# Patient Record
Sex: FEMALE | Race: WHITE | NOT HISPANIC OR LATINO | Employment: FULL TIME | URBAN - METROPOLITAN AREA
[De-identification: names, ages, dates, MRNs, and addresses within clinical notes are randomized per-mention and may not be internally consistent; named-entity substitution may affect disease eponyms.]

---

## 2021-05-29 ENCOUNTER — RECORDS - HEALTHEAST (OUTPATIENT)
Dept: ADMINISTRATIVE | Facility: CLINIC | Age: 21
End: 2021-05-29

## 2023-01-06 ENCOUNTER — HOSPITAL ENCOUNTER (OUTPATIENT)
Dept: BEHAVIORAL HEALTH | Facility: CLINIC | Age: 23
Discharge: HOME | End: 2023-01-06
Attending: FAMILY MEDICINE | Admitting: FAMILY MEDICINE
Payer: COMMERCIAL

## 2023-01-06 ENCOUNTER — BEH TREATMENT PLAN (OUTPATIENT)
Dept: BEHAVIORAL HEALTH | Facility: CLINIC | Age: 23
End: 2023-01-06
Attending: PSYCHIATRY & NEUROLOGY
Payer: COMMERCIAL

## 2023-01-06 PROCEDURE — 90791 PSYCH DIAGNOSTIC EVALUATION: CPT | Mod: GT,95 | Performed by: COUNSELOR

## 2023-01-06 ASSESSMENT — ANXIETY QUESTIONNAIRES
GAD7 TOTAL SCORE: 12
5. BEING SO RESTLESS THAT IT IS HARD TO SIT STILL: SEVERAL DAYS
2. NOT BEING ABLE TO STOP OR CONTROL WORRYING: MORE THAN HALF THE DAYS
IF YOU CHECKED OFF ANY PROBLEMS ON THIS QUESTIONNAIRE, HOW DIFFICULT HAVE THESE PROBLEMS MADE IT FOR YOU TO DO YOUR WORK, TAKE CARE OF THINGS AT HOME, OR GET ALONG WITH OTHER PEOPLE: SOMEWHAT DIFFICULT
1. FEELING NERVOUS, ANXIOUS, OR ON EDGE: MORE THAN HALF THE DAYS
7. FEELING AFRAID AS IF SOMETHING AWFUL MIGHT HAPPEN: SEVERAL DAYS
6. BECOMING EASILY ANNOYED OR IRRITABLE: MORE THAN HALF THE DAYS
3. WORRYING TOO MUCH ABOUT DIFFERENT THINGS: MORE THAN HALF THE DAYS
GAD7 TOTAL SCORE: 12

## 2023-01-06 ASSESSMENT — COLUMBIA-SUICIDE SEVERITY RATING SCALE - C-SSRS
6. HAVE YOU EVER DONE ANYTHING, STARTED TO DO ANYTHING, OR PREPARED TO DO ANYTHING TO END YOUR LIFE?: NO
2. HAVE YOU ACTUALLY HAD ANY THOUGHTS OF KILLING YOURSELF?: NO
TOTAL  NUMBER OF ABORTED OR SELF INTERRUPTED ATTEMPTS LIFETIME: NO
ATTEMPT LIFETIME: NO
TOTAL  NUMBER OF INTERRUPTED ATTEMPTS LIFETIME: NO
1. HAVE YOU WISHED YOU WERE DEAD OR WISHED YOU COULD GO TO SLEEP AND NOT WAKE UP?: NO

## 2023-01-06 ASSESSMENT — PATIENT HEALTH QUESTIONNAIRE - PHQ9
5. POOR APPETITE OR OVEREATING: MORE THAN HALF THE DAYS
SUM OF ALL RESPONSES TO PHQ QUESTIONS 1-9: 6

## 2023-01-06 NOTE — PROGRESS NOTES
"    Admission SBAR NOTE  Adult  Outpatient Programs          SITUATION:     Admission Date: 2023    Provider verified identity through the following two step process.  Patient provided: verbal spelling of full first and last name and Patient     Patient name:  Lady Ayers  Preferred name: Lady She/Her/Hers/Herself 22 year old  Diagnosis/-es (copy from [facility], including ICD-10):   300.3 (F42) Obsessive Compulsive Disorder.  4. Other Diagnoses that is relevant to services:   300.02 (F41.1) Generalized Anxiety Disorder.      Assigned Program/Track: [facility]    Reviewed patient's schedule and informed them of any variation due to holidays. yes    Does the patient have any planned absences and/or barriers to admission/treatment? yes - start job on  (FT 8a-430p)  NOTE: impact of transportation, technology, childcare, work, or housing concerns.    Insurance: Payor: BCBS / Plan: [employer] [place] / Product Type: Indemnity /  Changes/Concerns: no    Does patient need an appointment with the program provider? yes Tues at 2pm, [facility]  NOTE: If yes, please confirm/schedule provider visit.      BACKGROUND:     Patient's stated goal/reason for treatment (copy from [facility]; confirm with patient): \"\" OCD, Anxiety.To get some tools under my belt, so when I run into things when I get high anxiety, I can get through them a lot easier.\"      ASSESSMENT:     Please consult  if any of the following concerns may impact admission/participation in program:     PHQ, LUKE and PROMIS done within 7 days OR send upon admission if over 7 days.      [place] Suicide Severity Rating (select Lifetime/Recent):   [place] Suicide Severity Rating Scale (Lifetime/Recent)  [place] Suicide Severity Rating (Lifetime/Recent) 2023   1. Wish to be Dead (Lifetime) 0   2. Non-Specific Active Suicidal Thoughts (Lifetime) 0   Actual Attempt (Lifetime) 0   Has subject engaged in non-suicidal self-injurious behavior? (Lifetime) 0 "   Interrupted Attempts (Lifetime) 0   Aborted or Self-Interrupted Attempt (Lifetime) 0   Preparatory Acts or Behavior (Lifetime) 0   Calculated C-SSRS Risk Score (Lifetime/Recent) No Risk Indicated       WHODAS completed for recommended level of care? yes  Current WHODAS was assigned and patient needs the following level of care based on score 19          Copy/Paste current Safety Plan to the BEH TX PLAN ENCOUNTER. no  - Per DA: Patient denies current self-injurious ideation and behaviors       Safety status/concerns: no     Substance use concerns: no     Pertinent Medical/Nutritional concerns: no    Review Tele-Health Requirements (including secure environment, confidentiality, in-state status, equipment needs and process - encourage MyChart): yes    Confirm Emergency Contact listed in the SnapShot/Demographics with patient and notify [facility] if an update is required. yes ;   Mika Ayers [phone]       Paper or Docusign requirements for ROIs, e-CASIMIRO, emergency contact, etc have been completed? no - resent  If not, do upon admission.     Does patient have FMLA or Short-Term Disability requests/plans? no  NOTE: Whenever possible, FMLA or Short-Term Disability paperwork needs to be managed/completed by the patient's community provider.   Exceptions: Patient does not have a community provider AND request is specific to mental health and time off for the duration of the program participation.    Notify RN Triage as soon as possible.     Care Providers/Medication Management Needs:     Does patient have a current community or other [facility] provider prescribing medications for mental health? no  - has PCP  NOTE: Delete below if not applicable:    Psychiatric Provider (or PCP if managing MH meds)/Name: none; will make referral        NOTE: Inform patients, program is temporary and we will not be transferring care. Patient's should continue to see their community provider.       Individual Therapist/Name:  None;  will make referral      RECOMMENDATIONS:     Patient Admission Completed: yes    Care Team, referrals made/needed: yes  - psych and IT referrals  PCP: Shun Wood  NOTE: Notify RN, as needed, to make internal referrals.                                                             Completed by: Lizz Haro RN

## 2023-01-06 NOTE — PROGRESS NOTES
"RN Review of Medical History / Physical Health Screen  Outpatient Mental Health Programs - [facility] Day [facility]    PATIENT'S NAME: Lady Ayers  MRN:   [id number]  :   2000  ACCT. NUMBER: [id number]  CURRENT AGE:  22 year old    DATE OF DIAGNOSTIC ASSESSMENT: 2023    DATE OF ADMISSION: 23     Please see Diagnostic Assessment for additional Medical History.     General Health:   Have you had any exposure to any communicable disease in the past 2-3 weeks? no     Are you aware of safe sex practices? yes   Do you have a history of seizures?     If so, do you have a seizure plan? Known triggers?     Notify patient that we will call 911 (if virtual) or a code (if in-person), if we were to witness seizure during group. no    no      yes     Nutrition:    Are you on a special diet? If yes, please explain:  no   Do you have any concerns regarding your nutritional status? If yes, please explain:  yes - would like to get better eating habits r/t tiredness   Have you had any appetite changes in the last 3 months?  Yes, little bit less hungry r/t anxiety     Have you had any weight loss or weight gain in the last 3 months?  Yes, how much? Lost a few pounds     Do you have a history of an eating disorder? no   Do you have a history of being in an eating disorder program? no         Height/Weight Review:  Patient reported height:  5'6\"      Patient reports weight:  Date last checked:  118 pounds   yesterday   Any referrals/needs identified?  none          Patient height and weight recorded by RN in epic flowsheet: No; Unable to measure  Programmatic Care currently provided via telehealth. All pt weights and heights will be collected through patient self-report and recorded in physical health screening progress note upon admission to the program.      BMI Review:  Was the patient informed of BMI? no      Findings See above         Fall Risk:   Have you had any falls in the " past 3 months? no     Do you currently use any assistive devices for mobility?   no      Does the patient have medication concerns? no - but would be curious about anxiety meds   If yes, RN to triage if patient will address concerns with their community provider or with our program psychiatrist.     Does the patient have any acute or chronic pain concerns that might impact participation in the program? no       Additional Comments/Assessment: Pt denies seizures (current/historical), dizziness, mobility concerns. No fall risk assessed; No safety concerns r/t falls. Reports occasional dizziness upon standing; nothing to the point of fainting/falling      Per completion of the Medical History / Physical Health Screen, is there a recommendation to see / follow up with a primary care physician/clinic or dentist?    No.      Lizz Haro RN  1/6/2023

## 2023-01-06 NOTE — PROGRESS NOTES
"[facility]      PATIENT'S NAME: Lady Ayers  PREFERRED NAME: Lady  PRONOUNS: she/her/hers     MRN: [id number]  : 2000  ADDRESS: [address]  [place] [id number]  ACCT. NUMBER:  [id number]  DATE OF SERVICE: 23  START TIME: 1100  END TIME: 1230  PREFERRED PHONE: [phone]  email:[email or web address]  May we leave a program related message: Yes  SERVICE MODALITY:  Video Visit:      Provider verified identity through the following two step process.  Patient provided:  Patient  and Patient address    Telemedicine Visit: The patient's condition can be safely assessed and treated via synchronous audio and visual telemedicine encounter.      Reason for Telemedicine Visit: Services only offered telehealth    Originating Site (Patient Location): Patient's home    Distant Site (Provider Location): Provider Remote Setting- Home Office    Consent:  The patient/guardian has verbally consented to: the potential risks and benefits of telemedicine (video visit) versus in person care; bill my insurance or make self-payment for services provided; and responsibility for payment of non-covered services.     Patient would like the video invitation sent by:  My Chart    Mode of Communication:  Video Conference via [employer]    Distant Location (Provider):  Off-site    As the provider I attest to compliance with applicable laws and regulations related to telemedicine.    UNIVERSAL ADULT Mental Health DIAGNOSTIC ASSESSMENT    Identifying Information:  Patient is a 22 year old,    individual.  Patient was referred for an assessment by self .  Patient attended the session alone.    Chief Complaint:   The reason for seeking services at this time is: \" OCD, Anxiety \"   The problem(s) began as always presents and would like to finally address. Patient has attempted to resolve these concerns in the past through providers while in college.    Social/Family " "History:  Patient reported they grew up in [place], [place] then [place], [place].  They were raised by biological parents.  Parents  16 years ago when the patient was 6 years old. The patient mother did not remarry and remains single The patient's father did remarry 12 years ago.   Patient reported that their childhood was \"amazing\".  Patient described their current relationships with family of origin as \"great relationship with mom, good with dad\".      The patient describes their cultural background as \"white - , Latter day\".  Cultural influences and impact on patient's life structure, values, norms, and healthcare: Patient denies.  Contextual influences on patient's health include: Contextual Factors: Individual Factors Unmanaged OCD, Anxiety.  Cultural, Contextual, and socioeconomic factors do not affect the patient's access to services.  These factors will be addressed in the Preliminary Treatment plan.  Patient identified their preferred language to be English. Patient reported they do not  need the assistance of an  or other support involved in therapy.     Patient reported had no significant delays in developmental tasks.   Patient's highest education level was college graduate. Patient identified the following learning problems: none reported.  Modifications will not be used to assist communication in therapy.   Patient reports they are  able to understand written materials.    Patient reported the following relationship history:  Patient reports being in a relationships until 12/17/22.  Patient's current relationship status is single for a couple of weeks.   Patient identified their sexual orientation as heterosexual.  Patient reported having zero child(sendy). Patient identified parents, siblings and friends as part of their support system.  Patient identified the quality of these relationships as stable and meaningful.     Patient's current living/housing situation involves staying " with family.  They live with dad and they report that housing is stable.     Patient is currently waiting to start their first job on the 30th.  Patient reports their finances are obtained through family.  Patient does not identify finances as a current stressor.      Patient reported that they have not been involved with the legal system.   Patient denies being on probation / parole / under the jurisdiction of the court.      Patient's Strengths and Limitations:  Patient identified the following strengths or resources that will help them succeed in treatment: commitment to health and well being. Things that may interfere with the patient's success in treatment include: none identified.     Assessments:  The following assessments were completed by patient for this visit:  PHQ9:   PHQ-9 SCORE 1/6/2023   PHQ-9 Total Score 6     GAD7:   LUKE-7 SCORE 1/6/2023   Total Score 12     CAGE-AID:   CAGE-AID Total Score 1/6/2023   Total Score 3     PROMIS 10-Global Health (only subscores and total score):   PROMIS-10 Scores Only 1/6/2023   Global Mental Health Score 13   Global Physical Health Score 15   PROMIS TOTAL - SUBSCORES 28     [place] Suicide Severity Rating Scale (Lifetime/Recent)  [place] Suicide Severity Rating (Lifetime/Recent) 1/6/2023   1. Wish to be Dead (Lifetime) 0   2. Non-Specific Active Suicidal Thoughts (Lifetime) 0   Actual Attempt (Lifetime) 0   Has subject engaged in non-suicidal self-injurious behavior? (Lifetime) 0   Interrupted Attempts (Lifetime) 0   Aborted or Self-Interrupted Attempt (Lifetime) 0   Preparatory Acts or Behavior (Lifetime) 0   Calculated C-SSRS Risk Score (Lifetime/Recent) No Risk Indicated       Personal and Family Medical History:  Patient does not report a family history of mental health concerns.  Patient reports family history includes Substance Abuse in her mother..     Patient does report Mental Health Diagnosis and/or Treatment.  Patient Patient reported the following  previous diagnoses which include(s): an Anxiety Disorder and Obsessive Compulsive Disorder.  Patient reported symptoms began during school years.   Patient has received mental health services in the past: therapy.  Psychiatric Hospitalizations: None.  Patient denies a history of civil commitment.  Patient is not receiving other mental health services.  These include none.         Patient has not had a physical exam to rule out medical causes for current symptoms.  Date of last physical exam was greater than a year ago and client was encouraged to schedule an exam with PCP. The patient does not have a Primary Care Provider and was encouraged to establish care with a PCP..  Patient reports no current medical and/or dental concerns.  Patient denies any issues with pain..   There are not significant appetite / nutritional concerns / weight changes. These may include: no concerns. Patient reports the following sleep concerns:  No concerns.   Patient does not report a history of head injury / trauma / cognitive impairment.      Patient reports not taking any current medications    Medication Adherence:  Patient reports not taking any medications.    Patient Allergies:  Not on File    Medical History:  History reviewed. No pertinent past medical history.      Current Mental Status Exam:   Appearance:  Appropriate    Eye Contact:  Good   Psychomotor:  Normal       Gait / station:  no problem  Attitude / Demeanor: Cooperative  Interested  Speech      Rate / Production: Normal/ Responsive      Volume:  Normal  volume      Language:  intact  Mood:   Normal  Affect:   Appropriate    Thought Content: Clear   Thought Process: Logical       Associations: No loosening of associations  Insight:   Good   Judgment:  Intact   Orientation:  All  Attention/concentration: Good      Substance Use:  Patient did report a family history of substance use concerns; see medical history section for details.  Patient has not received chemical  "dependency treatment in the past.  Patient has not ever been to detox.      Patient is not currently receiving any chemical dependency treatment. Patient reported the following problems as a result of their substance use: Patient denies.      Substance Age of first use Pattern and duration of use (include amounts and frequency) Date of last use     Withdrawal potential Route of administration   has used Alcohol 17 Patient reports drinking twice a week on the weekends on most weekends while college and would drink excessively during these episodes. 12/31/22 No Oral   has used Marijuana   16 Patient reports starting to use more consistently while in college and using dab carts.  Patient reports using \"actual weed\" and then has moved into Delta and CBD.   Patient reports using most days using a Delta Pen at night before bed. 12/20/22 No Smoke   has not used Amphetamines          has not used Cocaine/crack           has not used Hallucinogens        has not used Inhalants        has not used Heroin        has not used Other Opiates        has not used Benzodiazepine          has not used Barbiturates        has not used Over the counter meds.        has not use Caffeine        has used Nicotine  17 Patient reports using a vape daily, all day. 1/6/23 Yes Smoke   has not used other substances not listed above:  Identify:             Substance Use: passing out, hangovers and cravings/urges to use    Based on the positive CAGE score and clinical interview there  are indications of drug or alcohol abuse. However, patient contributes excessive use to college and has since graduated.  Patient expresses less concerns moving forward..      Significant Losses / Trauma / Abuse / Neglect Issues:   Patient   did not serve in the .  There are indications or report of significant loss, trauma, abuse or neglect issues related to: Parental Divorce at age 6, moms use of alcohol.  Concerns for possible neglect are not present. "     Safety Assessment:   Patient denies current homicidal ideation and behaviors.  Patient denies current self-injurious ideation and behaviors.    Patient denied risk behaviors associated with substance use.  Patient denies any high risk behaviors associated with mental health symptoms.  Patient reports the following current concerns for their personal safety: None.  Patient reports there  are not firearms in the house.       There are no firearms in the home..    History of Safety Concerns:  Patient denied a history of homicidal ideation.     Patient denied a history of personal safety concerns.    Patient denied a history of assaultive behaviors.    Patient denied a history of sexual assault behaviors.     Patient denied a history of risk behaviors associated with substance use.  Patient denies any history of high risk behaviors associated with mental health symptoms.  Patient reports the following protective factors:  future focused thinking    Risk Plan:  See Recommendations for Safety and Risk Management Plan    Review of Symptoms per patient report:   Depression: Lack of interest, Difficulties concentrating, Ruminations and Irritability  Patricia:  No Symptoms  Psychosis: No Symptoms  Anxiety: Excessive worry, Nervousness, Physical complaints, such as headaches, stomachaches, muscle tension, Social anxiety, Sleep disturbance, Ruminations and Irritability  Panic:  Sense of impending doom  Post Traumatic Stress Disorder:  No Symptoms   Eating Disorder: No Symptoms  ADD / ADHD:  Inattentive, Difficulties listening, Poor task completion, Poor organizational skills, Distractibility, Forgetful and Restlessness/fidgety  Conduct Disorder: No symptoms  Autism Spectrum Disorder: No symptoms  Obsessive Compulsive Disorder: Checking, Cleaning, Counting, Symetry and Obsessions    Patient reports the following compulsive behaviors and treatment history: Picking - has not had treatment. and Social Media - has not had  treatment.  Shopping has not had treatment.    Diagnostic Criteria:   Generalized Anxiety Disorder  A. Excessive anxiety and worry about a number of events or activities (such as work or school performance).   B. The person finds it difficult to control the worry.  C. Select 3 or more symptoms (required for diagnosis). Only one item is required in children.   - Restlessness or feeling keyed up or on edge.    - Being easily fatigued.    - Difficulty concentrating or mind going blank.    - Irritability.    - Muscle tension.   D. The focus of the anxiety and worry is not confined to features of an Axis I disorder.  E. The anxiety, worry, or physical symptoms cause clinically significant distress or impairment in social, occupational, or other important areas of functioning.   F. The disturbance is not due to the direct physiological effects of a substance (e.g., a drug of abuse, a medication) or a general medical condition (e.g., hyperthyroidism) and does not occur exclusively during a Mood Disorder, a Psychotic Disorder, or a Pervasive Developmental Disorder. Obsessive Compulsive Disorder Criteria: Obsessive Compulsive Disorder    (1) recurrent and persistent thoughts, impulses, or images that are experienced, at some time during the disturbance, as intrusive and inappropriate and that cause marked anxiety or distress     (2) the thoughts, impulses, or images are not simply excessive worries about real-life problems     (3) the client attempts to ignore or suppress such thoughts, impulses, or images, or to neutralize them with some other thought or action     (4) the client recognizes that the obsessional thoughts, impulses, or images are a product of his or her own mind (not imposed from without as in thought insertion)     (2) the behaviors or mental acts are aimed at preventing or reducing distress or preventing some dreaded event or situation; however, these behaviors or mental acts either are not connected in a  realistic way with what they are designed to neutralize or prevent or are clearly excessive   At some point during the course of the disorder, the person has recognized that the obsessions or compulsions are excessive or unreasonable  The obsessions or compulsions cause marked distress, are time consuming (take more than 1 hour a day), or significantly interfere with the person's normal routine, occupational (or academic) functioning, or usual social activities or relationships.   The content of the obsessions or compulsions are not restricted to another Axis I Disorder (e.g., preoccupation with food in the presence of an Eating Disorders; hair pulling in the presence of Trichotillomania; concern with appearance in the presence of Body Dysmorphic Disorder; preoccupation with drugs in the presence of a Substance Use Disorder; preoccupation with having a serious illness in the presence of Hypochondriasis; preoccupation with sexual urges or fantasies in the presence of a Paraphilia; or guilty ruminations in the presence of Major Depressive Disorder).   The disturbance is not due to the direct physiological effects of a substance (e.g., a drug of abuse, a medication) or a general medical condition    Functional Status:  Patient reports the following functional impairments:  management of the household and or completion of tasks, relationship(s) and social interactions.     Programmatic care:  Current WHODAS was assigned and patient needs the following level of care based on score 19  .    Clinical Summary:  1. Reason for assessment: to determine level of care  .  2. Psychosocial, Cultural and Contextual Factors: Unmanaged Anxiety and OCD  .  3. Principal DSM5 Diagnoses  (Sustained by DSM5 Criteria Listed Above):   300.3 (F42) Obsessive Compulsive Disorder.  4. Other Diagnoses that is relevant to services:   300.02 (F41.1) Generalized Anxiety Disorder.  5. Provisional Diagnosis: Further diagnosis clarification is  intentional.  6. Prognosis: Expect Improvement.  7. Likely consequences of symptoms if not treated: higher level of care care.  8. Client strengths include:  insightful, intelligent, motivated and open to learning .     Recommendations:     1. Plan for Safety and Risk Management:   Safety and Risk: Recommended that patient call 911 or go to the local ED should there be a change in any of these risk factors..          Report to child / adult protection services was NA.     2. Patient's identified Patient denies.     3. Initial Treatment will focus on:    Anxiety - ..     4. Resources/Service Plan:    services are not indicated.   Modifications to assist communication are not indicated.   Additional disability accommodations are not indicated.      5. Collaboration:   Collaboration / coordination of treatment will be initiated with the following  support professionals: None.      6.  Referrals:   The following referral(s) will be initiated: Outpatient Mental Health Therapy Group. Next Scheduled Appointment: [facility] with start 1/9/23.      A Release of Information has been obtained for the following: Emergency Contact.     Emergency Contact Mika Ayers was obtained.      Clinical Substantiation/medical necessity for the above recommendations:  Patient presents today in hopes of entering programming for OCD/Anxiety concerns.  Patient reports these concerns have been present and were managed while in college at [employer].  Patient reports she is seeking help now due to conversations with family.  Patient also expressed concerns regarding excessive vaping and is hoping to reduce use.  Patient denies current thoughts of self harm and suicidal ideation and reports ability to keep self safe.    7. MELQUIADES:    MELQUIADES:  Discussed the general effects of drugs and alcohol on health and well-being and Discussed the impact of drugs and alcohol when used during pregnancy. Provider gave patient printed information about the   effects of chemical use on their health and well being. Recommendations:  To abstain from all mood altering substances .     8. Records:   These were reviewed at time of assessment.   Information in this assessment was obtained from the medical record and provided by patient who is a good historian.    Patient will have open access to their mental health medical record.    9.   Interactive Complexity: No      Provider Name/ Credentials:  [facility]  January 6, 2023

## 2023-01-09 ENCOUNTER — HOSPITAL ENCOUNTER (OUTPATIENT)
Dept: BEHAVIORAL HEALTH | Facility: CLINIC | Age: 23
Discharge: HOME | End: 2023-01-09
Attending: PSYCHIATRY & NEUROLOGY
Payer: COMMERCIAL

## 2023-01-09 ENCOUNTER — TELEPHONE (OUTPATIENT)
Dept: BEHAVIORAL HEALTH | Facility: CLINIC | Age: 23
End: 2023-01-09

## 2023-01-09 DIAGNOSIS — F42.9 OBSESSIVE-COMPULSIVE DISORDER: Primary | ICD-10-CM

## 2023-01-09 PROCEDURE — 90853 GROUP PSYCHOTHERAPY: CPT | Mod: GT,95

## 2023-01-09 ASSESSMENT — ANXIETY QUESTIONNAIRES
GAD7 TOTAL SCORE: 12
GAD7 TOTAL SCORE: 12
3. WORRYING TOO MUCH ABOUT DIFFERENT THINGS: MORE THAN HALF THE DAYS
IF YOU CHECKED OFF ANY PROBLEMS ON THIS QUESTIONNAIRE, HOW DIFFICULT HAVE THESE PROBLEMS MADE IT FOR YOU TO DO YOUR WORK, TAKE CARE OF THINGS AT HOME, OR GET ALONG WITH OTHER PEOPLE: SOMEWHAT DIFFICULT
1. FEELING NERVOUS, ANXIOUS, OR ON EDGE: MORE THAN HALF THE DAYS
8. IF YOU CHECKED OFF ANY PROBLEMS, HOW DIFFICULT HAVE THESE MADE IT FOR YOU TO DO YOUR WORK, TAKE CARE OF THINGS AT HOME, OR GET ALONG WITH OTHER PEOPLE?: SOMEWHAT DIFFICULT
7. FEELING AFRAID AS IF SOMETHING AWFUL MIGHT HAPPEN: MORE THAN HALF THE DAYS
7. FEELING AFRAID AS IF SOMETHING AWFUL MIGHT HAPPEN: MORE THAN HALF THE DAYS
GAD7 TOTAL SCORE: 12
8. IF YOU CHECKED OFF ANY PROBLEMS, HOW DIFFICULT HAVE THESE MADE IT FOR YOU TO DO YOUR WORK, TAKE CARE OF THINGS AT HOME, OR GET ALONG WITH OTHER PEOPLE?: SOMEWHAT DIFFICULT
3. WORRYING TOO MUCH ABOUT DIFFERENT THINGS: MORE THAN HALF THE DAYS
GAD7 TOTAL SCORE: 12
5. BEING SO RESTLESS THAT IT IS HARD TO SIT STILL: SEVERAL DAYS
8. IF YOU CHECKED OFF ANY PROBLEMS, HOW DIFFICULT HAVE THESE MADE IT FOR YOU TO DO YOUR WORK, TAKE CARE OF THINGS AT HOME, OR GET ALONG WITH OTHER PEOPLE?: SOMEWHAT DIFFICULT
1. FEELING NERVOUS, ANXIOUS, OR ON EDGE: MORE THAN HALF THE DAYS
2. NOT BEING ABLE TO STOP OR CONTROL WORRYING: MORE THAN HALF THE DAYS
GAD7 TOTAL SCORE: 12
GAD7 TOTAL SCORE: 12
4. TROUBLE RELAXING: MORE THAN HALF THE DAYS
5. BEING SO RESTLESS THAT IT IS HARD TO SIT STILL: SEVERAL DAYS
1. FEELING NERVOUS, ANXIOUS, OR ON EDGE: MORE THAN HALF THE DAYS
4. TROUBLE RELAXING: MORE THAN HALF THE DAYS
7. FEELING AFRAID AS IF SOMETHING AWFUL MIGHT HAPPEN: MORE THAN HALF THE DAYS
7. FEELING AFRAID AS IF SOMETHING AWFUL MIGHT HAPPEN: MORE THAN HALF THE DAYS
GAD7 TOTAL SCORE: 12
3. WORRYING TOO MUCH ABOUT DIFFERENT THINGS: MORE THAN HALF THE DAYS
GAD7 TOTAL SCORE: 12
2. NOT BEING ABLE TO STOP OR CONTROL WORRYING: MORE THAN HALF THE DAYS
GAD7 TOTAL SCORE: 12
5. BEING SO RESTLESS THAT IT IS HARD TO SIT STILL: SEVERAL DAYS
IF YOU CHECKED OFF ANY PROBLEMS ON THIS QUESTIONNAIRE, HOW DIFFICULT HAVE THESE PROBLEMS MADE IT FOR YOU TO DO YOUR WORK, TAKE CARE OF THINGS AT HOME, OR GET ALONG WITH OTHER PEOPLE: SOMEWHAT DIFFICULT
IF YOU CHECKED OFF ANY PROBLEMS ON THIS QUESTIONNAIRE, HOW DIFFICULT HAVE THESE PROBLEMS MADE IT FOR YOU TO DO YOUR WORK, TAKE CARE OF THINGS AT HOME, OR GET ALONG WITH OTHER PEOPLE: SOMEWHAT DIFFICULT
7. FEELING AFRAID AS IF SOMETHING AWFUL MIGHT HAPPEN: MORE THAN HALF THE DAYS
6. BECOMING EASILY ANNOYED OR IRRITABLE: SEVERAL DAYS
7. FEELING AFRAID AS IF SOMETHING AWFUL MIGHT HAPPEN: MORE THAN HALF THE DAYS
2. NOT BEING ABLE TO STOP OR CONTROL WORRYING: MORE THAN HALF THE DAYS
6. BECOMING EASILY ANNOYED OR IRRITABLE: SEVERAL DAYS
6. BECOMING EASILY ANNOYED OR IRRITABLE: SEVERAL DAYS
4. TROUBLE RELAXING: MORE THAN HALF THE DAYS

## 2023-01-09 NOTE — GROUP NOTE
Psychotherapy Group Note    PATIENT'S NAME: Lady Ayers  MRN:   [id number]  :   2000  ACCT. NUMBER: [id number]  DATE OF SERVICE: 23  START TIME: 10:00 AM  END TIME: 10:50 AM  FACILITATOR: Frances Zavaleta LGSW  TOPIC: MH EBP Group: Relationship Skills  [facility]  TRACK: A-IOP    NUMBER OF PARTICIPANTS: 4    Summary of Group / Topics Discussed:  Relationship Skills: Boundaries: Patients were provided with a general overview of interpersonal boundaries and how lack of boundaries relates to symptoms and functioning. The purpose is to help patients identify boundary issues and gain awareness and skills to work towards healthier interpersonal boundaries. Current awareness of healthy boundary characteristics and barriers to establishing healthy boundaries were discussed.    Patient Session Goals / Objectives:    Familiarized patients with the concept of interpersonal boundaries and their characteristics    Discussed and practiced strategies to promote healthier interpersonal boundaries    Identified boundary issues and identified plan to improve boundaries      Patient Participation / Response:  Fully participated with the group by sharing personal reflections / insights and openly received / provided feedback with other participants.    Demonstrated understanding of topics discussed through group discussion and participation, Demonstrated understanding of relationship skills and communication skills and Identified / Expressed personal readiness to incorporate effective communication skills    Treatment Plan:  Patient has a current master individualized treatment plan.  See Epic treatment plan for more information.    JUAN J Schaffer

## 2023-01-09 NOTE — GROUP NOTE
Process Group Note    PATIENT'S NAME: Lady Ayers  MRN:   [id number]  :   2000  ACCT. NUMBER: [id number]  DATE OF SERVICE: 23  START TIME: 11:00 AM  END TIME: 11:50 AM  FACILITATOR: Kristal Costello LMFT  TOPIC:  Process Group    Diagnoses:  300.3 (F42) Obsessive Compulsive Disorder.  4. Other Diagnoses that is relevant to services:   300.02 (F41.1) Generalized Anxiety Disorder.         [facility] Mental Health Day Treatment  TRACK: AIOP    NUMBER OF PARTICIPANTS: 4                                      Service Modality:  Video Visit     Telemedicine Visit: The patient's condition can be safely assessed and treated via synchronous audio and visual telemedicine encounter.      Reason for Telemedicine Visit: Services only offered telehealth    Originating Site (Patient Location): Patient's home    Distant Site (Provider Location): Provider Remote Setting- Home Office    Consent:  The patient/guardian has verbally consented to: the potential risks and benefits of telemedicine (video visit) versus in person care; bill my insurance or make self-payment for services provided; and responsibility for payment of non-covered services.     Patient would like the video invitation sent by:  My Chart    Mode of Communication:  Video Conference via Medical Zoom    As the provider I attest to compliance with applicable laws and regulations related to telemedicine.                                  Data:  Symptom Management, Personal Safety, Develop / Improve Independent Living Skills and Develop Socialization / Interpersonal Relationship Skills  Session content: At the start of this group, patients were invited to check in by identifying themselves, describing their current emotional status, and identifying issues to address in this group.   Area(s) of treatment focus addressed in this session included .    Patient reported feeling distressed and tired today. Patient stated she has not been  sleeping well. Patient stated she was in [place] visiting family. Patient stated the distress comes from having graduated college three weeks ago and a recent break up with her first love. Patient also reported starting a new job later this month which is creating some anxiety. Patient reported goal is to do some self care things like get groceries, taking a bath. Patient stated barriers are self. Patient stated skills are to stop listening to the negative voice in her head. Patient stated another barrier is her phone. Patient reported last time she drank on New Years and is planning on doing dry January. Patient also reported vaping nicotine which she wants to quit. Patient endorsed using cannabis as well. Patient stated she has been using CBD as well. Patient reported feeling grateful for her friends and family and having them to support her through feedback, reassurance, distraction, and emotional support.         Therapeutic Interventions/Treatment Strategies:  Psychotherapist offered support, feedback and validation and reinforced use of skills. Treatment modalities used include Motivational Interviewing, Cognitive Behavioral Therapy and Dialectical Behavioral Therapy. Interventions include Behavioral Activation: Explored how behaviors effect mood and interact with thoughts and feelings, Mindfulness: Encouraged a plan to use mindfulness skills in daily life and Emotions Management:  Reinforced the purpose and biological basis of emotions, Discussed barriers to emotional regulation, Reviewed opposite action skill and Increased awareness of daily mood patterns/changes.    Assessment:    Patient response:   Patient responded to session by accepting feedback, listening, being attentive and accepting support    Possible barriers to participation / learning include: and no barriers identified    Health Issues:   None reported       Substance Use Review:   Substance Use: cannabis .     Mental Status/Behavioral  Observations  Appearance:   Appropriate   Eye Contact:   Good   Psychomotor Behavior: Normal   Attitude:   Cooperative  Interested  Orientation:   All  Speech   Rate / Production: Normal    Volume:  Normal   Mood:    Elevated  Normal  Affect:    Appropriate   Thought Content:   Clear  Thought Form:  Coherent  Logical     Insight:    Good     Plan:     Safety Plan: No current safety concerns identified.  Recommended that patient call 911 or go to the local ED should there be a change in any of these risk factors.     Barriers to treatment: None identified    Patient Contracts (see media tab):  None    Substance Use: Stage of Change: Contemplation     Continue or Discharge: Patient will continue in Adult Day Treatment (ADT)  as planned. Patient is likely to benefit from learning and using skills as they work toward the goals identified in their treatment plan.      MONTRELL Light  January 9, 2023

## 2023-01-09 NOTE — GROUP NOTE
Psychotherapy Group Note    PATIENT'S NAME: Lady Ayers  MRN:   [id number]  :   2000  ACCT. NUMBER: [id number]  DATE OF SERVICE: 23  START TIME:  9:00 AM  END TIME:  9:50 AM  FACILITATOR: Frances Zavaleta LGSW  TOPIC: MH EBP Group: Specialty Awareness  [facility] 55+ [facility]  TRACK: A-IOP    NUMBER OF PARTICIPANTS: 4    Summary of Group / Topics Discussed:  The purpose of this specialty topic is to help patients identify the?rules and expectations of the intensive outpatient program.?The focus will be on helping patients?to better understand how?to share and process emotions, discuss sensitive topics, regulate emotions during group sessions, and manage attendance expectations.???     Discuss program Welcome Letter?(including rules and expectations)     Discuss group-appropriate behaviors versus therapy-interfering behaviors ?     Identify strategies to share and regulate emotions during programming     Patient Session Goals / Objectives:  Patient?will:?     Verbalize understanding of?group rules and expectations     Verbalize understanding of?appropriate behaviors in group     Verbalize understanding of how to process emotions in group settings?                                       Service Modality:  Video Visit     Telemedicine Visit: The patient's condition can be safely assessed and treated via synchronous audio and visual telemedicine encounter.      Reason for Telemedicine Visit: Services only offered telehealth    Originating Site (Patient Location): Patient's home    Distant Site (Provider Location): Provider Remote Setting- Home Office    Consent:  The patient/guardian has verbally consented to: the potential risks and benefits of telemedicine (video visit) versus in person care; bill my insurance or make self-payment for services provided; and responsibility for payment of non-covered services.     Patient would like the video invitation sent by:  My Chart    Mode of  Communication:  Video Conference via Medical Zoom    As the provider I attest to compliance with applicable laws and regulations related to telemedicine.            Patient Participation / Response:  Fully participated with the group by sharing personal reflections / insights and openly received / provided feedback with other participants.    Demonstrated understanding of topics discussed through group discussion and participation, Identified / Expressed readiness to act on skill suggestions discussed in topic and Verbalized understanding of ways to proactively manage illness    Treatment Plan:  Patient has a current master individualized treatment plan.  See Epic treatment plan for more information.    Frances Zavaleta LGSW

## 2023-01-09 NOTE — TELEPHONE ENCOUNTER
LVM regarding admission.    Pt called back but unable to do admission forms d/t group starting. Will call at noon to complete.  Lizz Haro RN on 1/9/2023 at 9:00 AM

## 2023-01-10 ENCOUNTER — HOSPITAL ENCOUNTER (OUTPATIENT)
Dept: BEHAVIORAL HEALTH | Facility: CLINIC | Age: 23
Discharge: HOME | End: 2023-01-10
Attending: PSYCHIATRY & NEUROLOGY
Payer: COMMERCIAL

## 2023-01-10 DIAGNOSIS — F41.1 GAD (GENERALIZED ANXIETY DISORDER): ICD-10-CM

## 2023-01-10 DIAGNOSIS — F42.2 MIXED OBSESSIONAL THOUGHTS AND ACTS: ICD-10-CM

## 2023-01-10 PROBLEM — F42.9 OCD (OBSESSIVE COMPULSIVE DISORDER): Status: ACTIVE | Noted: 2023-01-10

## 2023-01-10 PROCEDURE — 90853 GROUP PSYCHOTHERAPY: CPT | Mod: GT,95

## 2023-01-10 PROCEDURE — 99214 OFFICE O/P EST MOD 30 MIN: CPT | Mod: 95

## 2023-01-10 NOTE — GROUP NOTE
Psychotherapy Group Note    PATIENT'S NAME: Lady Ayers  MRN:   [id number]  :   2000  ACCT. NUMBER: [id number]  DATE OF SERVICE: 1/10/23  START TIME:  9:00 AM  END TIME:  9:50 AM  FACILITATOR: Frances Zavaleta LGSW; Maddie Connros OTR/L  TOPIC: MH EBP Group: Coping Skills  [facility] 55+ [facility]  TRACK: A-IOP    NUMBER OF PARTICIPANTS: 3    Summary of Group / Topics Discussed:  Coping Skills: Grounding: Patients discussed and practiced strategies to increase attachment / presence to the current moment.  Patients identified situations in which using these strategies will help improve emotion regulation sense of calm in the body.  Reviewed the benefits of applying grounding strategies, as well as past / current practices of each member.  Patients identified situations in which using these strategies would reduce stress. They developed the ability to distinguish when these strategies can be useful in their lives for management and stress and psychological well-being.    Patient Session Goals / Objectives:    Understand the purpose of using grounding strategies to reduce stress.    Verbalize understanding of how and when to apply grounding strategies to reduce distress and increase presence in the moment.    Review patients current grounding practices and discuss a more formal way of practicing and accessing skills.    Practice using various calming strategies (e.g. 5-4-3-2-1; mental and body awareness).    Choose 1-2 grounding strategies to apply during times of distress.                                    Service Modality:  Video Visit     Telemedicine Visit: The patient's condition can be safely assessed and treated via synchronous audio and visual telemedicine encounter.      Reason for Telemedicine Visit: Services only offered telehealth    Originating Site (Patient Location): Patient's home    Distant Site (Provider Location): Provider Remote Setting- Home Office    Consent:  The patient/guardian  has verbally consented to: the potential risks and benefits of telemedicine (video visit) versus in person care; bill my insurance or make self-payment for services provided; and responsibility for payment of non-covered services.     Patient would like the video invitation sent by:  My Chart    Mode of Communication:  Video Conference via Medical Zoom    As the provider I attest to compliance with applicable laws and regulations related to telemedicine.                               Patient Participation / Response:  Fully participated with the group by sharing personal reflections / insights and openly received / provided feedback with other participants.    Demonstrated understanding of topics discussed through group discussion and participation, Expressed understanding of the relevance / importance of coping skills at distressing times in life and Demonstrated knowledge of when to consider using a variety of coping skills in daily life    Treatment Plan:  Patient has a current master individualized treatment plan.  See Epic treatment plan for more information.    Frances Zavaleta LGSW

## 2023-01-10 NOTE — GROUP NOTE
Process Group Note    PATIENT'S NAME: Lady Ayers  MRN:   [id number]  :   2000  ACCT. NUMBER: [id number]  DATE OF SERVICE: 1/10/23  START TIME: 11:00 AM  END TIME: 11:50 AM  FACILITATOR: Kristal Costello LMFT  TOPIC:  Process Group    Diagnoses:  300.3 (F42) Obsessive Compulsive Disorder.  4. Other Diagnoses that is relevant to services:   300.02 (F41.1) Generalized Anxiety Disorder.         [facility] Mental Health Day Treatment  TRACK: AIOP    NUMBER OF PARTICIPANTS: 3                                      Service Modality:  Video Visit     Telemedicine Visit: The patient's condition can be safely assessed and treated via synchronous audio and visual telemedicine encounter.      Reason for Telemedicine Visit: Patient has requested telehealth visit    Originating Site (Patient Location): Patient's home    Distant Site (Provider Location): Provider Remote Setting- Home Office    Consent:  The patient/guardian has verbally consented to: the potential risks and benefits of telemedicine (video visit) versus in person care; bill my insurance or make self-payment for services provided; and responsibility for payment of non-covered services.     Patient would like the video invitation sent by:  My Chart    Mode of Communication:  Video Conference via Medical Zoom    As the provider I attest to compliance with applicable laws and regulations related to telemedicine.                                  Data:    Session content: At the start of this group, patients were invited to check in by identifying themselves, describing their current emotional status, and identifying issues to address in this group.   Area(s) of treatment focus addressed in this session included Symptom Management, Personal Safety, Develop / Improve Independent Living Skills and Develop Socialization / Interpersonal Relationship Skills.    Patient reported feeling more content and relaxed. Patient reported getting more sleep  and is making today about doing good things for herself. Patient stated her goal for today is to get out of the house and do things she enjoys. Patient denied safety concerns. Patient endorsed nicotine use but is still working on slowing down. Patient reported feeling grateful for her car and be able to drive and listen to music.     Therapeutic Interventions/Treatment Strategies:  Psychotherapist offered support, feedback and validation and reinforced use of skills. Treatment modalities used include Motivational Interviewing, Cognitive Behavioral Therapy and Dialectical Behavioral Therapy. Interventions include Cognitive Restructuring:  Assisted patient in formulating new neutral/positive alternatives to challenge less helpful / ineffective thoughts and Facilitated recognition of the connection between negative thoughts and negative core beliefs, Emotions Management:  Reinforced the purpose and biological basis of emotions, Discussed barriers to emotional regulation and Increased awareness of daily mood patterns/changes and Relationship Skills: Discussed strategies to promote healthier understanding of interpersonal relationships.    Assessment:    Patient response:   Patient responded to session by accepting feedback, listening, focusing on goals, being attentive and accepting support    Possible barriers to participation / learning include: and no barriers identified    Health Issues:   None reported       Substance Use Review:   Substance Use: cannabis .     Mental Status/Behavioral Observations  Appearance:   Appropriate   Eye Contact:   Good   Psychomotor Behavior: Normal   Attitude:   Cooperative   Orientation:   All  Speech   Rate / Production: Normal    Volume:  Normal   Mood:    Normal  Affect:    Appropriate   Thought Content:   Clear  Thought Form:  Coherent  Logical     Insight:    Good     Plan:     Safety Plan: No current safety concerns identified.  Recommended that patient call 911 or go to the  local ED should there be a change in any of these risk factors.     Barriers to treatment: None identified    Patient Contracts (see media tab):  None    Substance Use: Not addressed in session     Continue or Discharge: Patient will continue in Adult Day Treatment (ADT)  as planned. Patient is likely to benefit from learning and using skills as they work toward the goals identified in their treatment plan.      Kristal Costelol, MONTRELL  January 10, 2023

## 2023-01-10 NOTE — H&P
"[facility] Mental Health Outpatient Programs  Provider Intake Note    Program (track): IOP    Patient: Lady Ayers  MRN: [id number]  : 2000  Acct. No.: [id number]  Date of Service:  1/10/23  Session Start Time:  14:00  Session End Time:  15:00      Diagnostic Assessment Date: 2023    Outpatient Providers:  Current Outpatient Psychiatric Provider: N/A   Current Outpatient Individual Psychotherapist: N/A   Primary Care Provider: Shun DEJESUS     Identifying Data:  Lady Ayers, a 22 year old-year-old with history of anxiety and OCD, presents for initial visit to provide oversight of programmatic care. Patient attended the phone/video session alone, uses she/her pronouns, and prefers to be called: \"Lady\"       Presenting Concern:  \"I have anxiety and OCD.\"   Per diagnostic assessment: \"OCD & Anxiety\"    History of Present Illness:  Chart reviewed, history as documented reviewed with Lady. Patient endorses:    I have always been anxious and OCD    I was diagnosed by my past therapist  o Attended short online therapy 2 mummer ago  - I stopped therapy because it was not beneficial  o Never tried medications      Seeking treatment because anxiety and OCD are bothersome  o I decided to work on it   o interferes with thing I do    Medications  o No medications  o Cousin using medication that is helpful for OCD      Goals for Treatment (in addition to those goals listed in the BEH Treatment Plan Encounter):    Learn to be realistic    Manage OCD not being bothered       Psychiatric Review of Symptoms:  Review of systems recorded in diagnostic assessment reviewed with patient.  Today notes:    Anxiety  o Feeling tense, with physical  o Loss appetite  o Gets sweaty  o Irritable over situation should not be  o Over thinking about situation I soul be thing about  o Sleep is generally good with melatonin  - Difficulty falling back to asleep, " sometimes  o Appetite is good    OCD  o Obsessive  - Counting OCD, non even number cause distress    Buy thing in even number  - Ruminating thoughts about non even number  - Checking and rechecking door, light  - It is distressful  o Compulsive  - Buy things in even number even when noted  - Wearing colors for reason that  - Messing up with interpersonal relationship    ADHD  o I always end up with a bunch of unfinished task   o Interrupt other people when talking, cannot help it  o And lose stuff in the house  o Easily distracted    Safety Assessment:    Suicidal ideation: denies current or recent suicidal ideation or behavior    Thoughts of non-suicidal self-injury: denied    Recent self-injurious behavior: denied    Homicidal ideation: denied    Other safety concerns: denied    Substance use:    Alcohol  o Drinks about 2-3 drinks once to twice a week    Occasional marijuana  o Used CBD daily at bed time    Vaping nicotine  o 1-2 weeks per cartridge  o contemplating quitting      Per diagnostic assessment:     Medications:  No current outpatient medications on file.         The above list was reviewed with patient today.     Patient is taking medications as prescribed and denies adverse effects    Medical Review of Systems:  Pertinent: None      Recent Screenings:  WHODAS 2.0 Total Score 1/6/2023 1/10/2023   Total Score 19 27   Total Score MyChart - 27       Metrics:  PHQ-9 scores:   PHQ-9 SCORE 1/6/2023   PHQ-9 Total Score 6       LUKE-7 scores:   LUKE-7 SCORE 1/9/2023 1/9/2023 1/9/2023   Total Score - - 12 (moderate anxiety)   Total Score 12 12 12       CSSR-S:   PHQ 1/6/2023   PHQ-9 Total Score 6   Q9: Thoughts of better off dead/self-harm past 2 weeks Not at all         Psychiatric History:   Outpatient providers listed above.    Past medication trials include:    Never    Otherwise as noted above or in diagnostic assessment.       Substance Use History:  As noted above or in diagnostic assessment.     Past  "Medical History:  As noted above or in diagnostic assessment.     Vital Signs:  None since this is a phone/video visit.     Labs:  Most recent labs reviewed. Pertinent updates/findings: None.     Family History:   As noted above or in diagnostic assessment.     Social History:   As noted above or in diagnostic assessment.     Legal History:  As noted above or in diagnostic assessment.     Significant Losses / Trauma / Abuse / Neglect Issues:  As noted above or in diagnostic assessment.       Mental Status Examination (limited due to video virtual visit format):  Vital Signs: There were no vitals taken for this virtual visit.  Appearance: adequately groomed, appears stated age, and in no apparent distress.  Attitude: cooperative   Eye Contact: good to the extent that can be determined in a video visit  Muscle Strength and Tone: no gross abnormalities based on remote observation  Psychomotor Behavior: Appropriate and Calm; no evidence of tardive dyskinesia, dystonia, or tics based on remote observation  Gait and Station: normal, no gross abnormalities based on remote observation  Speech: clear, coherent, normal prosody, regular rate, regular rhythm and fluent  Associations: No loosening of associations  Thought Process: coherent and goal directed  Thought Content: no evidence of suicidal ideation or homicidal ideation, no evidence of psychotic thought, no auditory hallucinations present and no visual hallucinations present  Mood: \"anxious and concerned\"  Affect: mood congruent  Insight: good  Judgment: intact, adequate for safety  Impulse Control: intact  Oriented to: time, place, person and situation  Attention Span and Concentration: normal  Language: Intact  Recent and Remote Memory: intact to interview. Not formally assessed. No amnesia.  Fund of Knowledge/Assessment of Intelligence: Average  Capacity of Activities of Daily Living: Independent, able to participate in programmatic care services.      DSM5 " "Diagnosis/es:  1. Mixed obsessional thoughts and acts    2. LUKE (generalized anxiety disorder)      Rule out ADHD    Assessment/Plan:  Lady presents today for initial provider visit as part of program intake, coordination, and supervision.  She comes with a history of anxiety and also OCD since childhood.  Increased stress dealing with symptoms of OCD and anxiety prompted patient to seek treatment today.  She stated \"I have to deal with it now.\"  She endorses constantly feeling tense, poor appetite, constantly worrying, obsessive thoughts such as about even numbers and eventually buying even numbers of items.  Patient expressed concern about ADHD with always ending up with a bunch of unfinished task, distress about interrupting other people, losing items in the house, and getting easily distracted.  We discussed ADHD and I did not rule out ADHD diagnosis.   Patient has never tried psychiatric medication.  Per patient inquiry, we discussed the need for medications in addition to therapy in the management of current anxiety, OCD, and possibly ADHD symptoms.  Patient remains ambivalent about medication trials.  Patient agreed to meet with me in 1 week to further explore the need for medication.   Patient meets criteria for intensive outpatient program. Patient self identified goal is to learn skills to manage OCD and anxiety symptoms. Therefore psychotherapy, milieu therapy, and medication management will be the most impactful treatment modality to help patient reach self identified goal. Patient verbalized understanding and agreed to treatment plan.        OCD  o Engage in psychotherapy  o Meet in 1 week to discuss pyschopharmarcology       Anxiety  o As above      Rule out ADHD    Risk Assessment    Today Lady denies any current safety concerns including suicidal ideation, self-harm, and homicidal ideation     Lady is future-oriented and engaged in treatment planning     I do not feel that Lady meets " criteria for a 72-hour involuntary hold and remains appropriate for an outpatient level of care.     Continue therapy as planned:    Enrolled in IOP    Patient continues to meet criteria for recommended level of care.    Patient is expected to make a timely and significant improvement in the presenting acute symptoms as a result of participation in this program.    Patient would be at reasonable risk of requiring a higher level of care in the absence of current services.    Continue with individual therapist as appropriate    Safety plan reviewed.     To the Emergency Department as needed or call after hours crisis line at [phone] or [phone]. [place] Crisis Text Line: Text [place] to 610014  or  Suicide LifeLine Chat: suicidepreFrodio.org/chat    Follow-up:     schedule an appointment with me or another program provider in approximately in 1 week(s) or sooner if needed.  Can speak with a staff member or call the appropriate program number (see below) to schedule    Follow up with outpatient provider(s) as planned or sooner if needed for acute medical concerns.    Questions or concerns:    Call program line with questions or concerns (see below)    Kadoinkt may be used to communicate with your provider, but this is not intended to be used for emergencies.      [facility] Adult Mental Health Program lines:  [facility]: [phone]  Dual Disorder: [phone]  Adult Day Treatment:  [phone]  55+/Intensive Outpatient: [phone]      Community Resources:    National Suicide Prevention Lifeline: 988 from any phone, or 811-411-2820 (TTY: 203.855.3917). Call anytime for help.  (www.suicidepreventionlifeline.org)  National [place] on Mental Illness (www.mich.org): 363.302.3340 or 158-183-7877.   Mental Health Association (www.mentalhealth.org): 134.172.1972 or 512-179-8005.  [place] Crisis Text Line: Text [place] to 066181  Suicide LifeLine Chat:  suicidepreventionlifeline.org/chat    Treatment Objective(s) Addressed in This Session:  One purpose of today's call is for this writer to provide oversight of patient's care while receiving program services. Specific treatment goals addressed included personal safety, symptoms stabilization and management, wellness and mental health, and community resources/discharge planning.     Patient agrees with the current plan of care.    Signed:   JESUSITA SCHILLING CNP   January 10, 2023      Visit Details:  Type of service:  Video Visit    Start/End Time: see above    Originating Location (pt. Location): Home in [place]    Distant Location (provider location): Provider Remote Setting- Home Office    Platform used for Video Visit: Zoom    Physician has received verbal consent for a Video Visit from the patient? Yes    60 minutes spent on the date of the encounter doing chart review, patient visit, documentation and discussion with other provider(s)     This document completed in part using Dragon Medical One dictation software.  Please excuse any inadvertent word or phrase substitutions.

## 2023-01-10 NOTE — PROGRESS NOTES
"Adult Outpatient Programs  Individualized Treatment Plan       Date of Plan:     Name: Lady Ayers MRN: [id number]    : 2000     Program: Adult Day Treatment Program (ADT)    Clinical Track: aiop-am (team Hiwot Amato, [facility], Tianna Nicole, [facility], Ector Kumar RN, Maddie Connors MA, OTR/L, Jamie Rome, [facility], Radha De Oliveira, [facility], BC-DMT)    DSM5 Diagnosis:  300.3 (F42) Obsessive Compulsive Disorder.  300.02 (F41.1) Generalized Anxiety Disorder.    ADT Multidisciplinary Team Members:  Dr. Piero Pablo MD; Amrita Mario NP  Lady Ayers will participate in the Adult Outpatient Programs Clinic Group; 3 day per week, 3 hours per day.   Anticipated duration/discharge: 12 weeks    Due to COVID-19, services will be delivered via telemedicine until further notice.     Program Start Date: 2023  Anticipated Discharge Date: 4/3/2023 (pending authorization/clinical changes)    Review Date: Does Lady Ayers continue to meet criteria to participate in the ADT Program, 3 days per week; 3 hours per day?   2023 yes   2023 Discharge-MONTRELL Light on 2023 at 1:00 PM                 Client Strengths:   insightful, intelligent, motivated and open to learning    Client Participation in Plan:  Contributed to goals and plan   Attended individual treatment plan meeting on 2023  Received copy of treatment plan     Areas of Vulnerability:  Anxiety  OCD    Long-Term Goals:  Knowledge about illness and management of symptoms   Maintenance of personal safety      Abuse Prevention Plan:  Safe, therapeutic environment   Safety coping plan as needed   Education regarding illness and skill development   Coordination with care providers     Discharge Criteria:  Satisfactory progress toward treatment goals   Has a discharge plan in place   Regular attendance as scheduled     Areas of Treatment Focus       Why are you seeking treatment/What do you want to focus on during treatment? \" " "OCD, Anxiety \"         Area of Treatment Focus:   Personal Safety  Start Date:    1/11/2023    Goal:  Target Date: 3/8/2023, discharge Status: Completed  Client will notify staff when needing assistance to develop or implement a coping plan to manage suicidal or self injurious urges.  Client will use coping plan for safety, as needed.    Safety Assessment:   Patient denies current homicidal ideation and behaviors.  Patient denies current self-injurious ideation and behaviors.    Patient denied risk behaviors associated with substance use.  Patient denies any high risk behaviors associated with mental health symptoms.  Patient reports the following current concerns for their personal safety: None.  Patient reports there  are not firearms in the house.    Calculated C-SSRS Risk Score (Lifetime/Recent) No Risk Indicated         Progress:  1/11/2023: Met with care team. Set and discussed goals. Progress notes will be updated during treatment days to reflect current safety status. Pt was agreeable to goal when described. Continue.    1/26/2023: Patient discharged early due to needing to return to work.           Treatment Strategies:   Assist clients in establishing / strengthening support network  Assess / reassess level of potential for harm to self or others  Engage in safety planning when indicated  Facilitate increased self awareness      Area of Treatment Focus:   Symptom Stabilization and Management  Start Date:   1/11/2023    Goal:  Target Date: 3/8/2023, discharge Status: Stopped  Will report on symptoms and identify 1-3 skills to use to manage mental health, e.g. address negative self-talk, improve self-compassion, etc.      Progress:   1/11/2023: Met with team members. Discussed program, process, and progress. Discussed and set treatment goals. \"Continue to learn and apply skills to manage symptoms of anxiety and OCD. Everytime I experience it I get riled up and its hard to calm down. So tips and tricks to " "manage it better and it wont bother me as much. Improve ability to be in the present moment through use of mindfulness skills. I was thinking some thing as present moment. Sometimes I am toxic to myself and thinking about the past and not having symptoms of anxiety and OCD and missing my brain back then. Being more accepting and acknowledging of mental health instead of pushing it off and being in denial about it.\" Continue.       1/26/2023: Patient discharged early due to needing to return to work.         Treatment Strategies:   Assess / reassess for appropriate therapy program involvement, encourage participation in therapies  Facilitate increased self awareness  Provide education regarding symptoms management  Teach adaptive coping skills and communication skills      Area of Treatment Focus:   Community Resources / Support and Discharge Planning  Start Date:    1/11/2023    Goal:  Target Date: 3/8/2023, discharge Status: Stopped  Will develop an aftercare / transition plan by discharge, engaging in at least 1-2 behaviors supporting wellness and/or connection, e.g. address nutrition and gain cooking skills, address medication changes, increase routine and wellness activities - yoga, meditation, journaling, explore leisure, etc.      Progress:  1/11/2023: Met with care team. Discussed and set goals. Pt was given resources (e.g. Psychology Today, Volunteer Match, BILL, etc) in [facility] discharge planning group. Regarding professional supports, Lady identified the need for therapy and psychiatry. Regarding personal supports, \"A big one is my family and friends they are all pretty aware of how I am and my situation so I would say family and friends. Not interested in expanding support system, comfortable with who I have.\" Regarding wellness, \"I'm usually pretty tired all the time, but also nutrition. I eat a lot of fast food. I dont really know how to cook. I tried to go to the gym and I felt better after. Going in " "to medication, I want to wean off melatonin. Talking about ADHD meds.\" Regarding responsibilities and obligations, \"I start my first job on the 30th of January. I would say getting more into a routine of getting up earlier. I want to get more in to journaling, meditation, yoga. With the time before I start, that would be a very beneficial thing to work on.\"  Regarding leisure, \"anything outside in the water, I really enjoy hiking I usually go to [employer]. I like being outside. I kind of want to try reading.\" Continue.      1/26/2023: Patient discharged early due to needing to return to work.       Treatment Strategies:   Assist clients in establishing / strengthening support network  Assist with discharge planning  Facilitate increased self awareness  Provide education regarding resources       Maddie Connors MA, OTR/L 1/10/2023        NOTE: Signatures are available on the Acknowledgement of Treatment Plan located in Chart Review    The Individualized Treatment Plan Signature Page has been routed to the provider for co-sign.    I have reviewed the patient's Individualized Treatment Plan and agree with the current goals, interventions and level of care.     Piero Pablo MD  1/12/2023   "

## 2023-01-10 NOTE — GROUP NOTE
Psychoeducation Group Note    PATIENT'S NAME: Lady Ayers  MRN:   [id number]  :   2000  ACCT. NUMBER: [id number]  DATE OF SERVICE: 1/10/23  START TIME: 10:00 AM  END TIME: 10:50 AM  FACILITATOR: Frances Zavaleta LGSW; Quoc Kumar RN  TOPIC:  Wellness Group: Medication Education and Management  [facility] 55+ Program  TRACK: A-IOP    NUMBER OF PARTICIPANTS: 3    Summary of Group / Topics Discussed:  Medication Educations and Management:  Medication Jeopardy: Patients provided education regarding medication safety, antidepressants, side effects, neuroleptics, expected medication outcomes, knowledge of diagnosis, symptoms, and symptom management through an engaging jeopardy-style format.     Patient Session Goals / Objectives:    ? Participated in team-based Jeopardy game  ? Identified strategies for safe use, handling, and disposal of medications  ? Discussed basic aspects of medication safety, side effects, adverse outcomes and contraindications                                    Service Modality:  Video Visit     Telemedicine Visit: The patient's condition can be safely assessed and treated via synchronous audio and visual telemedicine encounter.      Reason for Telemedicine Visit: Services only offered telehealth    Originating Site (Patient Location): Patient's home    Distant Site (Provider Location): Provider Remote Setting- Home Office    Consent:  The patient/guardian has verbally consented to: the potential risks and benefits of telemedicine (video visit) versus in person care; bill my insurance or make self-payment for services provided; and responsibility for payment of non-covered services.     Patient would like the video invitation sent by:  My Chart    Mode of Communication:  Video Conference via Medical Zoom    As the provider I attest to compliance with applicable laws and regulations related to telemedicine.                               Patient Participation /  Response:  Fully participated with the group by sharing personal reflections / insights and openly received / provided feedback with other participants.     Demonstrated understanding of topics discussed through group discussion and participation, Identified / Expressed personal readiness to practice skills and Verbalized understanding of medication education and management topic    Treatment Plan:  Patient has a current master individualized treatment plan.  See Epic treatment plan for more information.    Frances Zavaleta LGSW

## 2023-01-11 ENCOUNTER — HOSPITAL ENCOUNTER (OUTPATIENT)
Dept: BEHAVIORAL HEALTH | Facility: CLINIC | Age: 23
Discharge: HOME | End: 2023-01-11
Attending: PSYCHIATRY & NEUROLOGY
Payer: COMMERCIAL

## 2023-01-11 DIAGNOSIS — F42.2 MIXED OBSESSIONAL THOUGHTS AND ACTS: Primary | ICD-10-CM

## 2023-01-11 DIAGNOSIS — F41.1 GAD (GENERALIZED ANXIETY DISORDER): ICD-10-CM

## 2023-01-11 PROCEDURE — 90853 GROUP PSYCHOTHERAPY: CPT | Mod: GT,95

## 2023-01-11 NOTE — GROUP NOTE
Service Modality:  Video Visit     Telemedicine Visit: The patient's condition can be safely assessed and treated via synchronous audio and visual telemedicine encounter.      Reason for Telemedicine Visit: Services only offered telehealth    Originating Site (Patient Location): Patient's home    Distant Site (Provider Location): Provider Remote Setting- Home Office    Consent:  The patient/guardian has verbally consented to: the potential risks and benefits of telemedicine (video visit) versus in person care; bill my insurance or make self-payment for services provided; and responsibility for payment of non-covered services.     Patient would like the video invitation sent by:  My Chart    Mode of Communication:  Video Conference via Medical Zoom    As the provider I attest to compliance with applicable laws and regulations related to telemedicine.                           Process Group Note    PATIENT'S NAME: Lady Ayers  MRN:   [id number]  :   2000  ACCT. NUMBER: [id number]  DATE OF SERVICE: 23  START TIME: 11:00 AM  END TIME: 11:50 AM  FACILITATOR: Nissa Acevedo LMFT  TOPIC:  Process Group    Diagnoses:  300.3 (F42) Obsessive Compulsive Disorder.  4. Other Diagnoses that is relevant to services:   300.02 (F41.1) Generalized Anxiety Disorder.       [facility]+ Program  TRACK: Admit 1    NUMBER OF PARTICIPANTS: 4          Data:    Session content: At the start of this group, patients were invited to check in by identifying themselves, describing their current emotional status, and identifying issues to address in this group.   Area(s) of treatment focus addressed in this session included Symptom Management.  Processed feeling higher anxiety this morning. Reports noticing morning are more anxious then other parts of the day. Reports her goal is to clean up around her home from moving back. Reports barrier will be staying focused. Reports she is taking  "medications, no substance use or safety concerns. Feeling grateful for the support of group.     Therapeutic Interventions/Treatment Strategies:  Psychotherapist offered support, feedback and validation and reinforced use of skills. Treatment modalities used include Cognitive Behavioral Therapy. Interventions include Coping Skills: Discussed how the use of intentional \"in the moment\" actions can help reduce distress and Promoted understanding of how and when to apply grounding strategies to reduce distress and increase presence in the moment.    Assessment:    Patient response:   Patient responded to session by accepting feedback, giving feedback and listening    Possible barriers to participation / learning include: and no barriers identified    Health Issues:   None reported       Substance Use Review:   Substance Use: No active concerns identified.    Mental Status/Behavioral Observations  Appearance:   Appropriate   Eye Contact:   Good   Psychomotor Behavior: Normal   Attitude:   Cooperative   Orientation:   All  Speech   Rate / Production: Normal    Volume:  Normal   Mood:    Normal  Affect:    Appropriate   Thought Content:   Clear  Thought Form:  Coherent  Logical     Insight:    Fair     Plan:     Safety Plan: No current safety concerns identified.  Recommended that patient call 911 or go to the local ED should there be a change in any of these risk factors.     Barriers to treatment: None identified    Patient Contracts (see media tab):  None    Substance Use: Not addressed in session     Continue or Discharge: Patient will continue in 55+ Program (55+) as planned. Patient is likely to benefit from learning and using skills as they work toward the goals identified in their treatment plan.      MONTERLL Chinchilla  January 11, 2023    "

## 2023-01-11 NOTE — GROUP NOTE
Psychoeducation Group Note    PATIENT'S NAME: Lady Ayers  MRN:   [id number]  :   2000  ACCT. NUMBER: [id number]  DATE OF SERVICE: 23  START TIME: 10:00 AM  END TIME: 10:30 AM  FACILITATOR: Kristal Costello LMFT  TOPIC:  Wellness Group: Health Maintenance  [facility] Adult Mental Health Day Treatment  TRACK: AIOP    NUMBER OF PARTICIPANTS: 4                                      Service Modality:  Video Visit     Telemedicine Visit: The patient's condition can be safely assessed and treated via synchronous audio and visual telemedicine encounter.      Reason for Telemedicine Visit: Patient has requested telehealth visit    Originating Site (Patient Location): Patient's home    Distant Site (Provider Location): Provider Remote Setting- Home Office    Consent:  The patient/guardian has verbally consented to: the potential risks and benefits of telemedicine (video visit) versus in person care; bill my insurance or make self-payment for services provided; and responsibility for payment of non-covered services.     Patient would like the video invitation sent by:  My Chart    Mode of Communication:  Video Conference via Medical Zoom    As the provider I attest to compliance with applicable laws and regulations related to telemedicine.       Summary of Group / Topics Discussed:  Health Maintenance: Discharge planning/Community resources: Patients worked on completing an instructor-facilitated discharge planning activity. Discharge planning begins for all patients after admission. Competent discharge planning promotes a successful transition and decreases the likelihood of mental health relapse. In this group, all dimensions of wellness were reviewed to assess for needs/discharge readiness. These dimensions included: physical, emotional, occupational/productivity, environmental, social, spiritual, intellectual, and financial. Patients worked on completing/updating their discharge planning and  identifying their treatment needs prior to time of discharge.     Patient Session Goals / Objectives:  ? Identified unmet treatment needs to accomplish before discharge  ? Completed all dimensions of the discharge planning packet  ? Participated in the planning process, make phone calls, set up appointments, got connected with community resources, followed up with treatment team as needed         Patient Participation / Response:  Fully participated with the group by sharing personal reflections / insights and openly received / provided feedback with other participants.    Demonstrated understanding of topics discussed through group discussion and participation, Identified / Expressed personal readiness to practice skills and Verbalized understanding of health maintenance topic    Treatment Plan:  Patient has a current master individualized treatment plan and today was our weekly review of the patient's progress.  See Epic treatment plan for progress / updates on goals and plan.    Kristal Costello LMFT

## 2023-01-11 NOTE — PROGRESS NOTES
I have reviewed my treatment plan with my therapist on 1/11/2023.   I agree with the plan as it is written in the electronic health record. (Admission IOP AM)    Name:      Signature:  Lady Colungadarius Unable to sign due to Covid-19, verbal permission given 1/11/2023, 9am.     Piero Pablo MD  Psychiatrist/Medical Director Piero Pablo MD on 1/12/2023 at 8:44 AM   MONTRELL Damon  Psychotherapist MONTRELL Light on 1/11/2023 at 4:10 PM    Maddie Connors MA, OTR/L  Maddie Connors MA, OTR/L on January 11, 2023 at 2:40 PM

## 2023-01-11 NOTE — GROUP NOTE
Psychoeducation Group Note    PATIENT'S NAME: Lady Ayers  MRN:   [id number]  :   2000  ACCT. NUMBER: [id number]  DATE OF SERVICE: 23  START TIME:  9:00 AM  END TIME:  9:50 AM  FACILITATOR: Kristal Costello LMFT; Maddie Connors OTR/L  TOPIC:  Wellness Group: Health Maintenance  [facility] Adult [facility] Health Day Treatment  TRACK: AIOP    NUMBER OF PARTICIPANTS: 4                                      Service Modality:  Video Visit     Telemedicine Visit: The patient's condition can be safely assessed and treated via synchronous audio and visual telemedicine encounter.      Reason for Telemedicine Visit: Services only offered telehealth    Originating Site (Patient Location): Patient's home    Distant Site (Provider Location): Provider Remote Setting- Home Office    Consent:  The patient/guardian has verbally consented to: the potential risks and benefits of telemedicine (video visit) versus in person care; bill my insurance or make self-payment for services provided; and responsibility for payment of non-covered services.     Patient would like the video invitation sent by:  My Chart    Mode of Communication:  Video Conference via Medical Zoom    As the provider I attest to compliance with applicable laws and regulations related to telemedicine.                            Summary of Group / Topics Discussed:  Health Maintenance: Discharge planning/Community resources: Patients worked on completing an instructor-facilitated discharge planning activity. Discharge planning begins for all patients after admission. Competent discharge planning promotes a successful transition and decreases the likelihood of mental health relapse. In this group, all dimensions of wellness were reviewed to assess for needs/discharge readiness. These dimensions included: physical, emotional, occupational/productivity, environmental, social, spiritual, intellectual, and financial. Patients worked on  completing/updating their discharge planning and identifying their treatment needs prior to time of discharge.     Patient Session Goals / Objectives:  ? Identified unmet treatment needs to accomplish before discharge  ? Completed all dimensions of the discharge planning packet  ? Participated in the planning process, make phone calls, set up appointments, got connected with community resources, followed up with treatment team as needed         Patient Participation / Response:  Fully participated with the group by sharing personal reflections / insights and openly received / provided feedback with other participants.    Demonstrated understanding of topics discussed through group discussion and participation, Identified / Expressed personal readiness to practice skills and Verbalized understanding of health maintenance topic    Treatment Plan:  Patient has a current master individualized treatment plan and today was our weekly review of the patient's progress.  See Epic treatment plan for progress / updates on goals and plan.    Kristal Costello LMFT

## 2023-01-11 NOTE — ADDENDUM NOTE
Encounter addended by: Maddie Connors, OTR/L on: 1/11/2023 2:41 PM   Actions taken: Clinical Note Signed

## 2023-01-16 ENCOUNTER — HOSPITAL ENCOUNTER (OUTPATIENT)
Dept: BEHAVIORAL HEALTH | Facility: CLINIC | Age: 23
Discharge: HOME | End: 2023-01-16
Attending: PSYCHIATRY & NEUROLOGY
Payer: COMMERCIAL

## 2023-01-16 DIAGNOSIS — F42.2 MIXED OBSESSIONAL THOUGHTS AND ACTS: Primary | ICD-10-CM

## 2023-01-16 DIAGNOSIS — F41.1 GAD (GENERALIZED ANXIETY DISORDER): ICD-10-CM

## 2023-01-16 PROCEDURE — 90853 GROUP PSYCHOTHERAPY: CPT | Mod: GT,95

## 2023-01-16 NOTE — GROUP NOTE
Psychoeducation Group Note    PATIENT'S NAME: Lady Ayers  MRN:   [id number]  :   2000  ACCT. NUMBER: [id number]  DATE OF SERVICE: 23  START TIME: 10:00 AM  END TIME: 10:50 AM  FACILITATOR: Kristal Costello LMFT  TOPIC:  Wellness Group: Health Maintenance  [facility] Adult Mental Health Day Treatment  TRACK: 2A    NUMBER OF PARTICIPANTS: 7                                      Service Modality:  Video Visit     Telemedicine Visit: The patient's condition can be safely assessed and treated via synchronous audio and visual telemedicine encounter.      Reason for Telemedicine Visit: Services only offered telehealth    Originating Site (Patient Location): Patient's home    Distant Site (Provider Location): Provider Remote Setting- Home Office    Consent:  The patient/guardian has verbally consented to: the potential risks and benefits of telemedicine (video visit) versus in person care; bill my insurance or make self-payment for services provided; and responsibility for payment of non-covered services.     Patient would like the video invitation sent by:  My Chart    Mode of Communication:  Video Conference via Medical Zoom    As the provider I attest to compliance with applicable laws and regulations related to telemedicine.           Summary of Group / Topics Discussed:  Health Maintenance: Goal Setting: Meaningful goals can bring a sense of direction and purpose in life.  They also highlight our most important values. Patients were assisted by instructor to identify short term goals to promote their mental health recovery and improve overall health and wellness. Patients were educated on SMART goal setting framework as a strategy to increase outcomes and promote success.    Patient Session Goals / Objectives:  ? Explained the key concepts of SMART goal setting framework  ? Identified three goals successfully using SMART goal setting framework  ? Reviewed concept of balance in wellness as  it pertains to goal setting        Patient Participation / Response:  Fully participated with the group by sharing personal reflections / insights and openly received / provided feedback with other participants.    Demonstrated understanding of topics discussed through group discussion and participation, Identified / Expressed personal readiness to practice skills and Verbalized understanding of health maintenance topic    Treatment Plan:  Patient has a current master individualized treatment plan.  See Epic treatment plan for more information.    Kristal Costello LMFT

## 2023-01-16 NOTE — GROUP NOTE
Psychotherapy Group Note    PATIENT'S NAME: Lady Ayers  MRN:   [id number]  :   2000  ACCT. NUMBER: [id number]  DATE OF SERVICE: 23  START TIME: 11:00 AM  END TIME: 11:50 AM  FACILITATOR: Ligia Bliss LICSW  TOPIC: MH EBP Group: Self-Awareness  [facility] Adult Mental Health Day Treatment  TRACK: 2A                                    Service Modality:  Video Visit     Telemedicine Visit: The patient's condition can be safely assessed and treated via synchronous audio and visual telemedicine encounter.      Reason for Telemedicine Visit: Services only offered telehealth    Originating Site (Patient Location): Patient's home    Distant Site (Provider Location): [facility] Outpatient Setting: [facility]     Consent:  The patient/guardian has verbally consented to: the potential risks and benefits of telemedicine (video visit) versus in person care; bill my insurance or make self-payment for services provided; and responsibility for payment of non-covered services.     Patient would like the video invitation sent by:  My Chart    Mode of Communication:  Video Conference via Medical Zoom    As the provider I attest to compliance with applicable laws and regulations related to telemedicine.         NUMBER OF PARTICIPANTS: 6    Summary of Group / Topics Discussed:  Self-Awareness: Values: Patients identified personal values by examining development of their current values and how their values influence their daily functioning and life choices. Patients explored the impact of their values on their thoughts, feelings, and actions. Patients discussed definition of personal values and how they develop and change over time. The goal is to help patients reconcile value conflicts and achieve balance and flexibility to improve mood and daily functioning.     Patient Session Goals / Objectives:    Examined development of values and impact of values on functioning    Identified and prioritized  important values related to current well-being     Identified strategies to change or enhance values to positively impact symptoms    Assisted patients to find ways to adapt functioning to better fit their values      Patient Participation / Response:  Fully participated with the group by sharing personal reflections / insights and openly received / provided feedback with other participants.    Demonstrated understanding of topics discussed through group discussion and participation, Demonstrated understanding of values, strengths, and challenges to learn about themselves and Practiced skills in session    Treatment Plan:  Patient has a current master individualized treatment plan.  See Epic treatment plan for more information.    Ligia Ross, LICSW

## 2023-01-16 NOTE — GROUP NOTE
Process Group Note    PATIENT'S NAME: Lady Ayers  MRN:   [id number]  :   2000  ACCT. NUMBER: [id number]  DATE OF SERVICE: 23  START TIME:  9:00 AM  END TIME:  9:50 AM  FACILITATOR: Kristal Costello LMFT  TOPIC:  Process Group    Diagnoses:  300.3 (F42) Obsessive Compulsive Disorder.  4. Other Diagnoses that is relevant to services:   300.02 (F41.1) Generalized Anxiety Disorder.         [facility] Mental Health Day Treatment  TRACK: 2A    NUMBER OF PARTICIPANTS: 7                                      Service Modality:  Video Visit     Telemedicine Visit: The patient's condition can be safely assessed and treated via synchronous audio and visual telemedicine encounter.      Reason for Telemedicine Visit: Patient has requested telehealth visit    Originating Site (Patient Location): Patient's home    Distant Site (Provider Location): Provider Remote Setting- Home Office    Consent:  The patient/guardian has verbally consented to: the potential risks and benefits of telemedicine (video visit) versus in person care; bill my insurance or make self-payment for services provided; and responsibility for payment of non-covered services.     Patient would like the video invitation sent by:  My Chart    Mode of Communication:  Video Conference via Medical Zoom    As the provider I attest to compliance with applicable laws and regulations related to telemedicine.                                  Data:    Session content: At the start of this group, patients were invited to check in by identifying themselves, describing their current emotional status, and identifying issues to address in this group.   Area(s) of treatment focus addressed in this session included Symptom Management, Personal Safety, Develop / Improve Independent Living Skills and Develop Socialization / Interpersonal Relationship Skills.    Patient reported feeling tired and stated she did not sleep well. Patient reported while  being tired she struggles to adjust. Patient reported her goal for today is to do errands and go grocery shopping. Patient also reported a goal is to go to the gym. Patient stated barriers are herself and feeling tired. Patient denied safety concerns. Patient reported having some drinks over the weekend. Patient reported struggling with vaping and is unsure how to cope with anxiety. Patient also utilizes CBD before bed. Patient reported feeling grateful for being in group and proud of herself for attending.     Therapeutic Interventions/Treatment Strategies:  Psychotherapist offered support, feedback and validation and reinforced use of skills. Treatment modalities used include Motivational Interviewing, Cognitive Behavioral Therapy and Dialectical Behavioral Therapy. Interventions include Behavioral Activation: Explored how behaviors effect mood and interact with thoughts and feelings and Encouraged strategies to reduce individual procrastination and increase motivation by increasing goal-directed activities to enhance mood and reduce symptoms. and Emotions Management:  Reinforced the purpose and biological basis of emotions, Discussed barriers to emotional regulation and Increased awareness of daily mood patterns/changes.    Assessment:    Patient response:   Patient responded to session by accepting feedback, listening, focusing on goals, being attentive and accepting support    Possible barriers to participation / learning include: and no barriers identified    Health Issues:   None reported       Substance Use Review:   Substance Use: No active concerns identified.    Mental Status/Behavioral Observations  Appearance:   Appropriate   Eye Contact:   Good   Psychomotor Behavior: Normal   Attitude:   Cooperative   Orientation:   All  Speech   Rate / Production: Normal    Volume:  Normal   Mood:    Anxious  Normal  Affect:    Appropriate   Thought Content:   Clear  Thought Form:  Coherent  Logical      Insight:    Good     Plan:     Safety Plan: No current safety concerns identified.  Recommended that patient call 911 or go to the local ED should there be a change in any of these risk factors.     Barriers to treatment: None identified    Patient Contracts (see media tab):  None    Substance Use: Not addressed in session     Continue or Discharge: Patient will continue in Adult Day Treatment (ADT)  as planned. Patient is likely to benefit from learning and using skills as they work toward the goals identified in their treatment plan.      Kristal Costello, MONTRELL  January 16, 2023

## 2023-01-17 ENCOUNTER — HOSPITAL ENCOUNTER (OUTPATIENT)
Dept: BEHAVIORAL HEALTH | Facility: CLINIC | Age: 23
Discharge: HOME | End: 2023-01-17
Attending: PSYCHIATRY & NEUROLOGY
Payer: COMMERCIAL

## 2023-01-17 ENCOUNTER — TELEPHONE (OUTPATIENT)
Dept: BEHAVIORAL HEALTH | Facility: CLINIC | Age: 23
End: 2023-01-17
Payer: COMMERCIAL

## 2023-01-17 DIAGNOSIS — F41.1 GAD (GENERALIZED ANXIETY DISORDER): ICD-10-CM

## 2023-01-17 DIAGNOSIS — F42.2 MIXED OBSESSIONAL THOUGHTS AND ACTS: ICD-10-CM

## 2023-01-17 DIAGNOSIS — F41.1 GAD (GENERALIZED ANXIETY DISORDER): Primary | ICD-10-CM

## 2023-01-17 DIAGNOSIS — F42.2 MIXED OBSESSIONAL THOUGHTS AND ACTS: Primary | ICD-10-CM

## 2023-01-17 PROCEDURE — 90853 GROUP PSYCHOTHERAPY: CPT | Mod: GT,95

## 2023-01-17 PROCEDURE — 99215 OFFICE O/P EST HI 40 MIN: CPT | Mod: 95

## 2023-01-17 RX ORDER — FLUOXETINE 10 MG/1
10 TABLET, FILM COATED ORAL DAILY
Qty: 30 TABLET | Refills: 0 | Status: SHIPPED | OUTPATIENT
Start: 2023-01-17 | End: 2023-01-24

## 2023-01-17 NOTE — PROGRESS NOTES
"[facility]   Adult Mental Health Outpatient Programs  Provider Interval History Note    Program (track): Intensive Outpatient Program (track 2A)    PATIENT'S NAME: Lady Ayers  MRN:   [id number]  :   2000  ACCT. NUMBER: [id number]  DATE OF SERVICE: 23  CALL/VIDEO START TIME: 10:00  CALL/VIDEO END TIME: 10:44      Interval History:  \"I am been content.\" Lady presents today for follow-up and ongoing program supervision.   Endorses:    Mostly content but sometimes intense and anxious    A little to busy  o Causing some stress and anxiety  o Pressure to be at certain place or meeting with some people  - Feels pressure to meet social obligation  - The person who want to be there for everyone  o Talked about boundaries    Job starts soon, I have a lot of time right now    Transition from college to work    Medications    Symptoms:    I have anxiety everyday of my life    Significant OCD    Substance use:    Same a before    Reactions/thoughts about program:    I am getting good feedback    Hanging out with people with similar concerns      Risk Assessment:    Suicidal ideation: denies current or recent suicidal ideation or behavior    Thoughts of non-suicidal self-injury: denied    Recent self-injurious behavior: denied    Homicidal ideation: denied    Other safety concerns: denied      Medications:  Current Outpatient Medications   Medication Sig Dispense Refill     FLUoxetine (PROZAC) 10 MG tablet Take 1 tablet (10 mg) by mouth daily Take 5 mg for 5 days then 10 mg Daily 30 tablet 0         The above list was reviewed and updated in EPIC with patient today.     Patient is taking medications as prescribed and denies adverse effects N/A      Laboratory Results:  Most recent labs reviewed. Pertinent updates/findings: None.     Metrics:  PHQ-9 scores:   PHQ-9 SCORE 2023   PHQ-9 Total Score 6       LUKE-7 scores:   LUKE-7 SCORE 2023   Total " "Score - - 12 (moderate anxiety)   Total Score 12 12 12       CSSR-S: No flowsheet data found.      Mental Status Examination (limited due to video virtual visit format):  Vital Signs: There were no vitals taken for this virtual visit.  Appearance: adequately groomed, appears stated age, and in no apparent distress.  Attitude: cooperative   Eye Contact: good to the extent that can be determined in a video visit  Muscle Strength and Tone: no gross abnormalities based on remote observation  Psychomotor Behavior: Appropriate and Calm; no evidence of tardive dyskinesia, dystonia, or tics based on remote observation  Gait and Station: normal, no gross abnormalities based on remote observation  Speech: normal rate, production, volume, and rhythm of  Associations: No loosening of associations  Thought Process: coherent and goal directed  Thought Content: no evidence of suicidal ideation or homicidal ideation, no evidence of psychotic thought, no auditory hallucinations present and no visual hallucinations present  Mood: \"anxious\"  Affect: mood congruent  Insight: good  Judgment: intact, adequate for safety  Impulse Control: intact  Oriented to: time, place, person and situation  Attention Span and Concentration: normal  Language: Intact  Recent and Remote Memory: intact to interview. Not formally assessed. No amnesia.  Fund of Knowledge/Assessment of Intelligence: Average  Capacity of Activities of Daily Living: Independent, able to participate in programmatic care services.      Diagnosis/es:  1. LUKE (generalized anxiety disorder)    2. Mixed obsessional thoughts and acts        Assessment/Plan:  Lady presents today for follow up for and trials of medication shah anxiety and OCD.  Patient continues to report distress with OCD and anxiety symptoms. Today, patient was willing to try medications for anxiety and OCD.  Patient noted that she is starting a new job very soon, and is afraid anxiety and OCD symptoms might " "interfere with performance at work. Patient stated \"I almost failed my last year of college because it was always a struggle going through school with the OCD and anxiety.\"  Patient agreed to a trial of Fluoxetine.  Due to the anxiety of starting medication, patient preferred to start with a low dose of 5 mg for 5 days then will increase to 10 mg. Follow-up appointment in 1 week to assess the need for father titrating of Fluoxetine.  Meanwhile, patient will continue with psychotherapy.  Patient agreed with treatment plan.      Anxiety  o Overall stable   o Continue to engage in psychotherapy  o Start fluoxetine as soon as possible  - Monitor response and potential adverse reactions      OCD  o Overall stable   o As noted above    Continue all other medications as reviewed per electronic medical record today    Continue therapy as planned:    Enrolled in [facility]    Continues to benefit from services    Continues to meet criteria for this level of care    Patient would be at reasonable risk of requiring a higher level of care in the absence of current services.    I feel this patient does not meet criteria for an involuntary hold and is appropriate for treatment at an outpatient level of care.    Continue with individual therapist as appropriate    Safety plan reviewed:    To the Emergency Department as needed or call after hours crisis line at [phone] or [phone]. [place] Crisis Text Line: Text [place] to 373262 or Suicide LifeLine Chat: suicidepreventionlifeline.org/chat    Follow-up:     schedule an appointment with me or another program provider in approximately in 1 week(s) or sooner if needed.  Can speak with a staff member or call the appropriate program number (see below) to schedule    Follow up with outpatient provider(s) as planned or sooner if needed for acute medical concerns.    Questions or concerns:    Call program line with questions or concerns (see below)    [place] may be used to communicate with " your provider, but this is not intended to be used for emergencies.      [facility] Adult Mental Health Program lines:  [facility]: [phone]  Dual Disorder: [phone]  Adult Day Treatment:  [phone]  55+/Intensive Outpatient: [phone]    Community Resources:    National Suicide Prevention Lifeline: 988 from any phone, or 941-568-1201 (TTY: 110.403.8822). Call anytime for help.  (www.suicidepreventionlifeline.org)  National [place] on Mental Illness (www.mich.org): 286.687.2797 or 411-675-2665.   Mental Health Association (www.mentalhealth.org): 269.701.3070 or 012-734-6086.  [place] Crisis Text Line: Text [place] to 228209  Suicide LifeLine Chat: suicideEducanonline.org/chat    Treatment Objective(s) Addressed in This Session:  The purpose of today's virtual visit is for this writer to provide oversight of patient's care while receiving program services. Specific treatment goals addressed included personal safety, symptoms stabilization and management, wellness and mental health, and community resources/discharge planning.     This author or another program provider will follow up with the patient as noted above.     Patient agrees with the current plan of care.    JESUSITA SCHILLING CNP  1/17/23      Visit Details:  Type of service:  Video Visit    Start/End Time: see above    Originating Location (pt. Location): Home in [place]    Distant Location (provider location): Provider Remote Setting- Home Office    Platform used for Video Visit: Zoom    Physician has received verbal consent for a Video Visit from the patient? Yes    50 minutes spent on the date of the encounter doing chart review, patient visit, documentation and discussion with other provider(s)     This document completed in part using Dragon Medical One dictation software.  Please excuse any inadvertent word or phrase substitutions.

## 2023-01-17 NOTE — GROUP NOTE
Psychotherapy Group Note    PATIENT'S NAME: Lady Ayers  MRN:   [id number]  :   2000  ACCT. NUMBER: [id number]  DATE OF SERVICE: 23  START TIME: 10:00 AM  END TIME: 10:50 AM  FACILITATOR: Kristal Costello LMFT  TOPIC:  EBP Group: Symptom Awareness  [facility] Mental Health Day Treatment  TRACK: 2A    NUMBER OF PARTICIPANTS: 6                                      Service Modality:  Video Visit     Telemedicine Visit: The patient's condition can be safely assessed and treated via synchronous audio and visual telemedicine encounter.      Reason for Telemedicine Visit: Patient has requested telehealth visit    Originating Site (Patient Location): Patient's home    Distant Site (Provider Location): Provider Remote Setting- Home Office    Consent:  The patient/guardian has verbally consented to: the potential risks and benefits of telemedicine (video visit) versus in person care; bill my insurance or make self-payment for services provided; and responsibility for payment of non-covered services.     Patient would like the video invitation sent by:  My Chart    Mode of Communication:  Video Conference via Medical Zoom    As the provider I attest to compliance with applicable laws and regulations related to telemedicine.            Summary of Group / Topics Discussed:  Symptom Awareness: Mood Disorders: Patients received a general overview of mood disorders including depressive disorders, anxiety disorders, and panic disorders and how it relates to their current symptoms. The purpose is to promote understanding of their diagnoses and how it impacts their functioning. Patients reviewed their current awareness of symptoms and diagnoses. Patients received information regarding diagnoses, etiology, cultural, and environmental factors as well as impact on functioning.     Patient Session Goals / Objectives:  Discussed patient individual symptoms and experiences  Reviewed diagnostic criteria  and etiology of diagnoses       Patient Participation / Response:  {[facility] PROGRESS NOTE PATIENT PARTICIPATION:410886}    {[facility] PROGRESS NOTE PATIENT RESPONSE- SYMPTOM AWARENESS:400574}    Treatment Plan:  Patient has {[facility] PROGRAMMATIC [place] PLAN STATUS:618496}    Kristal Costello LMFT

## 2023-01-17 NOTE — GROUP NOTE
Process Group Note    PATIENT'S NAME: Lady Ayers  MRN:   [id number]  :   2000  ACCT. NUMBER: [id number]  DATE OF SERVICE: 23  START TIME:  9:00 AM  END TIME:  9:50 AM  FACILITATOR: Kristal Costello LMFT  TOPIC:  Process Group    Diagnoses:  300.3 (F42) Obsessive Compulsive Disorder.  4. Other Diagnoses that is relevant to services:   300.02 (F41.1) Generalized Anxiety Disorder.         [facility] Day Treatment  TRACK: 2A    NUMBER OF PARTICIPANTS: 6                                      Service Modality:  Video Visit     Telemedicine Visit: The patient's condition can be safely assessed and treated via synchronous audio and visual telemedicine encounter.      Reason for Telemedicine Visit: Patient has requested telehealth visit    Originating Site (Patient Location): Patient's home    Distant Site (Provider Location): Provider Remote Setting- Home Office    Consent:  The patient/guardian has verbally consented to: the potential risks and benefits of telemedicine (video visit) versus in person care; bill my insurance or make self-payment for services provided; and responsibility for payment of non-covered services.     Patient would like the video invitation sent by:  My Chart    Mode of Communication:  Video Conference via Medical Zoom    As the provider I attest to compliance with applicable laws and regulations related to telemedicine.                                  Data:    Session content: At the start of this group, patients were invited to check in by identifying themselves, describing their current emotional status, and identifying issues to address in this group.   Area(s) of treatment focus addressed in this session included Symptom Management, Personal Safety, Develop / Improve Independent Living Skills and Develop Socialization / Interpersonal Relationship Skills.    Patient reported feeling tired and content and relaxed. Patient reported feeling calm  since there is nothing on her schedule for today. Patient stated her goal for today is to work on relaxation. Patient stated she will take a nap and take a bath. Patient stated barriers are her phone so a skill is to put her phone down. Patient denied safety concerns. Patient reported doing better with her vape yesterday and hid it from herself for a couple hours. Patient endorsed using CBD at night. Patient reported feeling grateful for her step-brother coming over and cleaning up the driveway.     Therapeutic Interventions/Treatment Strategies:  Psychotherapist offered support, feedback and validation and reinforced use of skills. Treatment modalities used include Motivational Interviewing, Cognitive Behavioral Therapy and Dialectical Behavioral Therapy. Interventions include Behavioral Activation: Explored how behaviors effect mood and interact with thoughts and feelings and Encouraged strategies to reduce individual procrastination and increase motivation by increasing goal-directed activities to enhance mood and reduce symptoms., Emotions Management:  Reinforced the purpose and biological basis of emotions, Discussed barriers to emotional regulation and Increased awareness of daily mood patterns/changes and Relationship Skills: Assisted patients in implementing more effective communication skills in their relationships.    Assessment:    Patient response:   Patient responded to session by accepting feedback, listening, focusing on goals, being attentive and accepting support    Possible barriers to participation / learning include: and no barriers identified    Health Issues:   None reported       Substance Use Review:   Substance Use: cannabis .     Mental Status/Behavioral Observations  Appearance:   Appropriate   Eye Contact:   Good   Psychomotor Behavior: Normal   Attitude:   Cooperative   Orientation:   All  Speech   Rate / Production: Normal    Volume:  Normal   Mood:    Anxious   Normal  Affect:    Appropriate   Thought Content:   Clear  Thought Form:  Coherent  Logical     Insight:    Good     Plan:     Safety Plan: No current safety concerns identified.  Recommended that patient call 911 or go to the local ED should there be a change in any of these risk factors.     Barriers to treatment: None identified    Patient Contracts (see media tab):  None    Substance Use: Not addressed in session     Continue or Discharge: Patient will continue in Adult Day Treatment (ADT)  as planned. Patient is likely to benefit from learning and using skills as they work toward the goals identified in their treatment plan.      Kristal Costello, MONTRELL  January 17, 2023

## 2023-01-17 NOTE — TELEPHONE ENCOUNTER
Incoming [facility] PA request for Prozac. Insurance does typically does not cover prozac tablets. Called [facility], pharmacist stated the PA denial reason was unknown but agreed it likely is due to tablet/vs capsule. Only needs 5mg tablets for 5 days. After can switch to capsules.     No 5mg capsules are made. Will forward to PA dept      Prior Authorization Retail Medication Request    Medication/Dose: Prozac 10mg  ICD code (if different than what is on RX):     Previously Tried and Failed:  1st tier drug, but form is the concern  Rationale:  Provider wants pt to start at 5mg, no 5mg capsule available.     Insurance Name:  [facility]  Insurance ID:  [id number]

## 2023-01-18 NOTE — GROUP NOTE
Psychotherapy Group Note    PATIENT'S NAME: Lady Ayers  MRN:   [id number]  :   2000  ACCT. NUMBER: [id number]  DATE OF SERVICE: 23  START TIME: 11:00 AM  END TIME: 11:50 AM  FACILITATOR: Ligia Bliss LICSW  TOPIC: MH EBP Group: Self-Awareness  [facility] Adult Mental Health Day Treatment  TRACK: 2A                                    Service Modality:  Video Visit     Telemedicine Visit: The patient's condition can be safely assessed and treated via synchronous audio and visual telemedicine encounter.      Reason for Telemedicine Visit: Services only offered telehealth    Originating Site (Patient Location): Patient's home    Distant Site (Provider Location): [facility] Outpatient Setting: [facility] [place]     Consent:  The patient/guardian has verbally consented to: the potential risks and benefits of telemedicine (video visit) versus in person care; bill my insurance or make self-payment for services provided; and responsibility for payment of non-covered services.     Patient would like the video invitation sent by:  My Chart    Mode of Communication:  Video Conference via Medical Zoom    As the provider I attest to compliance with applicable laws and regulations related to telemedicine.         NUMBER OF PARTICIPANTS: 6    Summary of Group / Topics Discussed:  Self-Awareness: Personal Strengths: Topic focused on assisting patients in identifying personal strengths and how they relate to the management of mental health symptoms. Patients discussed the benefits of acknowledging their personal strengths and their impact on mood improvement, mindfulness, and perspective. Patients worked to increase time spent on recognition and appreciation of what is positive and working in their lives. The goal is to reduce rumination and negative thinking resulting in increased mindfulness and resilience. Patients will work to put skills into practice and problem-solve barriers.     Patient Session  Goals / Objectives:    Identified personal strengths    Identified barriers to recognition of personal strengths    Verbalized understanding of strategies to increase use of their strengths in management of daily symptoms      Patient Participation / Response:  Fully participated with the group by sharing personal reflections / insights and openly received / provided feedback with other participants.    Demonstrated understanding of topics discussed through group discussion and participation, Demonstrated understanding of values, strengths, and challenges to learn about themselves and Practiced skills in session    Treatment Plan:  Patient has a current master individualized treatment plan.  See Epic treatment plan for more information.    Ligia Ross, LICSW

## 2023-01-19 ENCOUNTER — LAB REQUISITION (OUTPATIENT)
Dept: LAB | Facility: CLINIC | Age: 23
End: 2023-01-19

## 2023-01-19 ENCOUNTER — HOSPITAL ENCOUNTER (OUTPATIENT)
Dept: BEHAVIORAL HEALTH | Facility: CLINIC | Age: 23
Discharge: HOME | End: 2023-01-19
Attending: PSYCHIATRY & NEUROLOGY
Payer: COMMERCIAL

## 2023-01-19 DIAGNOSIS — F41.1 GAD (GENERALIZED ANXIETY DISORDER): ICD-10-CM

## 2023-01-19 DIAGNOSIS — Z13.29 ENCOUNTER FOR SCREENING FOR OTHER SUSPECTED ENDOCRINE DISORDER: ICD-10-CM

## 2023-01-19 DIAGNOSIS — F42.2 MIXED OBSESSIONAL THOUGHTS AND ACTS: Primary | ICD-10-CM

## 2023-01-19 DIAGNOSIS — Z11.3 ENCOUNTER FOR SCREENING FOR INFECTIONS WITH A PREDOMINANTLY SEXUAL MODE OF TRANSMISSION: ICD-10-CM

## 2023-01-19 LAB — TSH SERPL DL<=0.005 MIU/L-ACNC: 0.96 UIU/ML (ref 0.3–4.2)

## 2023-01-19 PROCEDURE — 87491 CHLMYD TRACH DNA AMP PROBE: CPT | Performed by: NURSE PRACTITIONER

## 2023-01-19 PROCEDURE — 90853 GROUP PSYCHOTHERAPY: CPT | Mod: GT,95

## 2023-01-19 PROCEDURE — 87340 HEPATITIS B SURFACE AG IA: CPT | Performed by: NURSE PRACTITIONER

## 2023-01-19 PROCEDURE — 87389 HIV-1 AG W/HIV-1&-2 AB AG IA: CPT | Performed by: NURSE PRACTITIONER

## 2023-01-19 PROCEDURE — 84443 ASSAY THYROID STIM HORMONE: CPT | Performed by: NURSE PRACTITIONER

## 2023-01-19 PROCEDURE — 86780 TREPONEMA PALLIDUM: CPT | Performed by: NURSE PRACTITIONER

## 2023-01-19 PROCEDURE — 86803 HEPATITIS C AB TEST: CPT | Performed by: NURSE PRACTITIONER

## 2023-01-19 NOTE — GROUP NOTE
Psychotherapy Group Note    PATIENT'S NAME: Lady Ayers  MRN:   [id number]  :   2000  ACCT. NUMBER: [id number]  DATE OF SERVICE: 23  START TIME: 11:00 AM  END TIME: 11:50 AM  FACILITATOR: Ligia Bliss LICSW  TOPIC: MH EBP Group: Self-Awareness  [facility] Adult Mental Health Day Treatment  TRACK: 2A                                    Service Modality:  Video Visit     Telemedicine Visit: The patient's condition can be safely assessed and treated via synchronous audio and visual telemedicine encounter.      Reason for Telemedicine Visit: Services only offered telehealth    Originating Site (Patient Location): Patient's home    Distant Site (Provider Location): [facility] Outpatient Setting: [facility]     Consent:  The patient/guardian has verbally consented to: the potential risks and benefits of telemedicine (video visit) versus in person care; bill my insurance or make self-payment for services provided; and responsibility for payment of non-covered services.     Patient would like the video invitation sent by:  My Chart    Mode of Communication:  Video Conference via Medical Zoom    As the provider I attest to compliance with applicable laws and regulations related to telemedicine.         NUMBER OF PARTICIPANTS: 7    Summary of Group / Topics Discussed:  Self-Awareness: Personal Strengths part 2: Topic focused on assisting patients in identifying personal strengths and how they relate to the management of mental health symptoms. Patients discussed the benefits of acknowledging their personal strengths and their impact on mood improvement, mindfulness, and perspective. Patients worked to increase time spent on recognition and appreciation of what is positive and working in their lives. The goal is to reduce rumination and negative thinking resulting in increased mindfulness and resilience. Patients will work to put skills into practice and problem-solve barriers.     Patient  Session Goals / Objectives:    Identified personal strengths    Identified barriers to recognition of personal strengths    Verbalized understanding of strategies to increase use of their strengths in management of daily symptoms      Patient Participation / Response:  Fully participated with the group by sharing personal reflections / insights and openly received / provided feedback with other participants.    Demonstrated understanding of topics discussed through group discussion and participation and Demonstrated understanding of values, strengths, and challenges to learn about themselves    Treatment Plan:  Patient has a current master individualized treatment plan.  See Epic treatment plan for more information.    Ligia Ross, AMBARSW

## 2023-01-19 NOTE — GROUP NOTE
Process Group Note    PATIENT'S NAME: Lady Ayers  MRN:   [id number]  :   2000  ACCT. NUMBER: [id number]  DATE OF SERVICE: 23  START TIME: 10:00 AM  END TIME: 10:58 AM  FACILITATOR: Kristal Costello LMFT  TOPIC:  Process Group    Diagnoses:  300.3 (F42) Obsessive Compulsive Disorder.  4. Other Diagnoses that is relevant to services:   300.02 (F41.1) Generalized Anxiety Disorder.         [facility] Day Treatment  TRACK: 2A    NUMBER OF PARTICIPANTS: 8                                      Service Modality:  Video Visit     Telemedicine Visit: The patient's condition can be safely assessed and treated via synchronous audio and visual telemedicine encounter.      Reason for Telemedicine Visit: Patient has requested telehealth visit    Originating Site (Patient Location): Patient's home    Distant Site (Provider Location): Provider Remote Setting- Home Office    Consent:  The patient/guardian has verbally consented to: the potential risks and benefits of telemedicine (video visit) versus in person care; bill my insurance or make self-payment for services provided; and responsibility for payment of non-covered services.     Patient would like the video invitation sent by:  My Chart    Mode of Communication:  Video Conference via Medical Zoom    As the provider I attest to compliance with applicable laws and regulations related to telemedicine.                                  Data:    Session content: At the start of this group, patients were invited to check in by identifying themselves, describing their current emotional status, and identifying issues to address in this group.   Area(s) of treatment focus addressed in this session included Symptom Management, Personal Safety, Develop / Improve Independent Living Skills and Develop Socialization / Interpersonal Relationship Skills.    Patient reported feeling tired today. Patient stated even if sleeping for ten hours some  nights she is still feeling exhausted. Patient also reported feeling anxious. Patient stated planning to start Prozac today and is nervous about the side effects. Patient stated her goal for today is to do errands and go  her medication. Patient stated barriers are ready to relax. Patient denied safety concerns. Patient endorsed using her vape and CBD pen. Patient denied drinking alcohol. Patient stated she left her vape pen yesterday at home for a few hours which was good. Patient reported feeling grateful for group and feeling better at the end of group.     Therapeutic Interventions/Treatment Strategies:  Psychotherapist offered support, feedback and validation and reinforced use of skills. Treatment modalities used include Motivational Interviewing, Cognitive Behavioral Therapy and Dialectical Behavioral Therapy. Interventions include Behavioral Activation: Reinforced benefits/challenges of change process through applying skills to replace unwanted behaviors, Explored how behaviors effect mood and interact with thoughts and feelings and Encouraged strategies to reduce individual procrastination and increase motivation by increasing goal-directed activities to enhance mood and reduce symptoms. and Emotions Management:  Reinforced the purpose and biological basis of emotions, Discussed barriers to emotional regulation and Increased awareness of daily mood patterns/changes.    Assessment:    Patient response:   Patient responded to session by accepting feedback, giving feedback, listening, focusing on goals, being attentive and accepting support    Possible barriers to participation / learning include: and no barriers identified    Health Issues:   None reported       Substance Use Review:   Substance Use: No active concerns identified.    Mental Status/Behavioral Observations  Appearance:   Appropriate   Eye Contact:   Good   Psychomotor Behavior: Normal   Attitude:   Cooperative    Orientation:   All  Speech   Rate / Production: Normal    Volume:  Normal   Mood:    Anxious  Normal  Affect:    Appropriate   Thought Content:   Clear  Thought Form:  Coherent  Logical     Insight:    Good     Plan:     Safety Plan: No current safety concerns identified.  Recommended that patient call 911 or go to the local ED should there be a change in any of these risk factors.     Barriers to treatment: None identified    Patient Contracts (see media tab):  None    Substance Use: Not addressed in session     Continue or Discharge: Patient will continue in Adult Day Treatment (ADT)  as planned. Patient is likely to benefit from learning and using skills as they work toward the goals identified in their treatment plan.      MONTRELL Light  January 19, 2023

## 2023-01-19 NOTE — GROUP NOTE
Psychotherapy Group Note    PATIENT'S NAME: Lady Ayers  MRN:   [id number]  :   2000  ACCT. NUMBER: [id number]  DATE OF SERVICE: 23  START TIME:  9:00 AM  END TIME:  9:50 AM  FACILITATOR: Carmen Reynolds LMFT  TOPIC: MH EBP Group: Emotions Management  [facility] Adult Mental Health Day Treatment  TRACK: 2A    NUMBER OF PARTICIPANTS: 8    Summary of Group / Topics Discussed:  Emotions Management: Mood Tracking: Patients discussed and reviewed different resources to track one s mood, with a goal of identifying patterns and correlations between different factors and mood state.  Patients discussed ways to increase awareness of one s mood and how it may be impacted by environmental factors, diet, activity level, medication, etc. The group shared their experiences and thought processes for feedback.      Patient Session Goals / Objectives:    Increase awareness of daily mood patterns/changes    Report out identified factors that impact their mood    Demonstrate understanding of how to use different resources to track mood, and effectively use these to help manage symptoms                                      Service Modality:  Video Visit     Telemedicine Visit: The patient's condition can be safely assessed and treated via synchronous audio and visual telemedicine encounter.      Reason for Telemedicine Visit: Patient has requested telehealth visit and Patient convenience (e.g. access to timely appointments / distance to available provider)    Originating Site (Patient Location): Patient's home    Distant Site (Provider Location): Provider Remote Setting- Home Office    Consent:  The patient/guardian has verbally consented to: the potential risks and benefits of telemedicine (video visit) versus in person care; bill my insurance or make self-payment for services provided; and responsibility for payment of non-covered services.     Patient would like the video invitation sent by:  My  Chart    Mode of Communication:  Video Conference via Medical Zoom    As the provider I attest to compliance with applicable laws and regulations related to telemedicine.                               Patient Participation / Response:  Moderately participated, sharing some personal reflections / insights and adequately adequately received / provided feedback with other participants.    Demonstrated understanding of topics discussed through group discussion and participation, Expressed understanding of the relevance / importance of emotions management skills at distressing times in life and Identified barriers to applying emotions management strategies    Treatment Plan:  Patient has a current master individualized treatment plan.  See Epic treatment plan for more information.    Carmen Reynolds LMFT

## 2023-01-20 LAB
C TRACH DNA SPEC QL PROBE+SIG AMP: NEGATIVE
HBV SURFACE AG SERPL QL IA: NONREACTIVE
HCV AB SERPL QL IA: NONREACTIVE
HIV 1+2 AB+HIV1 P24 AG SERPL QL IA: NONREACTIVE
N GONORRHOEA DNA SPEC QL NAA+PROBE: NEGATIVE
T PALLIDUM AB SER QL: NONREACTIVE

## 2023-01-20 NOTE — TELEPHONE ENCOUNTER
Central Prior Authorization Team - Phone: [phone]     PA Initiation    Medication: FLUOXETINE (Prozac) 10 mg TABLETS- PA INITIATED  Insurance Company:    Pharmacy Filling the Rx: [id number] - [place], [place] - [facility] [address]  Filling Pharmacy Phone: [phone]  Filling Pharmacy Fax:    Start Date: 1/20/2023

## 2023-01-23 ENCOUNTER — HOSPITAL ENCOUNTER (OUTPATIENT)
Dept: BEHAVIORAL HEALTH | Facility: CLINIC | Age: 23
Discharge: HOME | End: 2023-01-23
Attending: PSYCHIATRY & NEUROLOGY
Payer: COMMERCIAL

## 2023-01-23 DIAGNOSIS — F41.1 GAD (GENERALIZED ANXIETY DISORDER): ICD-10-CM

## 2023-01-23 DIAGNOSIS — F42.2 MIXED OBSESSIONAL THOUGHTS AND ACTS: Primary | ICD-10-CM

## 2023-01-23 PROCEDURE — 90853 GROUP PSYCHOTHERAPY: CPT | Mod: GT,95

## 2023-01-23 NOTE — TELEPHONE ENCOUNTER
Central Prior Authorization Team - Phone: [phone]     PRIOR AUTHORIZATION DENIED    Medication: FLUOXETINE (Prozac) 10 mg TABLETS- PA DENIED    Denial Date: 1/23/2023    Denial Rational: waiting on fax with denial rationale. Will update encounter once received          Appeal Information:. If the provider would like to appeal, please provide a letter of medical necessity and route back to the team. Otherwise you can close the encounter. Thank you, Central PA Team

## 2023-01-23 NOTE — GROUP NOTE
Process Group Note    PATIENT'S NAME: Lady Ayers  MRN:   [id number]  :   2000  ACCT. NUMBER: [id number]  DATE OF SERVICE: 23  START TIME:  9:00 AM  END TIME:  9:50 AM  FACILITATOR: Kristal Costello LMFT  TOPIC:  Process Group    Diagnoses:  300.3 (F42) Obsessive Compulsive Disorder.  4. Other Diagnoses that is relevant to services:   300.02 (F41.1) Generalized Anxiety Disorder.         [facility] Day Treatment  TRACK: 2A    NUMBER OF PARTICIPANTS: 6                                      Service Modality:  Video Visit     Telemedicine Visit: The patient's condition can be safely assessed and treated via synchronous audio and visual telemedicine encounter.      Reason for Telemedicine Visit: Patient has requested telehealth visit    Originating Site (Patient Location): Patient's home    Distant Site (Provider Location): Provider Remote Setting- Home Office    Consent:  The patient/guardian has verbally consented to: the potential risks and benefits of telemedicine (video visit) versus in person care; bill my insurance or make self-payment for services provided; and responsibility for payment of non-covered services.     Patient would like the video invitation sent by:  My Chart    Mode of Communication:  Video Conference via Medical Zoom    As the provider I attest to compliance with applicable laws and regulations related to telemedicine.                                  Data:    Session content: At the start of this group, patients were invited to check in by identifying themselves, describing their current emotional status, and identifying issues to address in this group.   Area(s) of treatment focus addressed in this session included Symptom Management, Personal Safety, Develop / Improve Independent Living Skills and Develop Socialization / Interpersonal Relationship Skills.    Patient reported feeling tired today and had interrupted sleep last night. Patient also  reported feeling anxious about starting work and having to do paperwork to get started. Patient stated she is starting work next Monday and is anxious about waking up early as well. Patient stated barriers are being tired and skills are opposite action. Patient endorsed drinking last weekend, but binge drank more. Patient stated she is planning on starting her Prozac today. Patient stated she bought a new vape as well. Patient stated she was disappointed in herself for getting one. Patient stated she has also used her Delta but noticed it has made her more paranoid. Patient stated she is feeling grateful for her dad coming home today and needs to pick him up from the airport.       Therapeutic Interventions/Treatment Strategies:  Psychotherapist offered support, feedback and validation and reinforced use of skills. Treatment modalities used include Motivational Interviewing, Cognitive Behavioral Therapy and Dialectical Behavioral Therapy. Interventions include Behavioral Activation: Reinforced benefits/challenges of change process through applying skills to replace unwanted behaviors and Explored how behaviors effect mood and interact with thoughts and feelings, Mindfulness: Encouraged a plan to use mindfulness skills in daily life and Emotions Management:  Reinforced the purpose and biological basis of emotions, Discussed barriers to emotional regulation and Increased awareness of daily mood patterns/changes.    Assessment:    Patient response:   Patient responded to session by accepting feedback, giving feedback, listening, being attentive and accepting support    Possible barriers to participation / learning include: and no barriers identified    Health Issues:   None reported       Substance Use Review:   Substance Use: alcohol .     Mental Status/Behavioral Observations  Appearance:   Appropriate   Eye Contact:   Good   Psychomotor Behavior: Normal   Attitude:   Cooperative   Orientation:   All  Speech   Rate /  Production: Normal    Volume:  Normal   Mood:    Anxious  Normal  Affect:    Appropriate   Thought Content:   Clear  Thought Form:  Coherent  Logical     Insight:    Good     Plan:     Safety Plan: No current safety concerns identified.  Recommended that patient call 911 or go to the local ED should there be a change in any of these risk factors.     Barriers to treatment: None identified    Patient Contracts (see media tab):  None    Substance Use: Provided support and affirmation for steps taken towards sobriety      Continue or Discharge: Patient will continue in Adult Day Treatment (ADT)  as planned. Patient is likely to benefit from learning and using skills as they work toward the goals identified in their treatment plan.      Kristal Costello, MONTRELL  January 23, 2023

## 2023-01-23 NOTE — GROUP NOTE
Psychotherapy Group Note    PATIENT'S NAME: Lady Ayers  MRN:   [id number]  :   2000  ACCT. NUMBER: [id number]  DATE OF SERVICE: 23  START TIME: 10:00 AM  END TIME: 10:50 AM  FACILITATOR: Kristal Costello LMFT  TOPIC:  EBP Group: Emotions Management  [facility] Day Treatment  TRACK: 2A    NUMBER OF PARTICIPANTS: 6                                      Service Modality:  Video Visit     Telemedicine Visit: The patient's condition can be safely assessed and treated via synchronous audio and visual telemedicine encounter.      Reason for Telemedicine Visit: Patient has requested telehealth visit    Originating Site (Patient Location): Patient's home    Distant Site (Provider Location): Provider Remote Setting- Home Office    Consent:  The patient/guardian has verbally consented to: the potential risks and benefits of telemedicine (video visit) versus in person care; bill my insurance or make self-payment for services provided; and responsibility for payment of non-covered services.     Patient would like the video invitation sent by:  My Chart    Mode of Communication:  Video Conference via Medical Zoom    As the provider I attest to compliance with applicable laws and regulations related to telemedicine.        Summary of Group / Topics Discussed:  Emotions Management: Understanding Emotions: Patients discussed the purpose of emotions and function they serve in our lives.  Reviewed core emotions, why they happen (triggers), and how they occur. The group assisted one anothers' understanding that: emotional experiences are important; difficult emotions have a place in our lives; and the differences between various emotions.    Patient Session Goals / Objectives:    Demonstrate understanding of types various emotions    Identify and discuss specific emotions and when they occur; understand triggers    Discuss barriers to emotional regulation      Patient Participation /  Response:  Fully participated with the group by sharing personal reflections / insights and openly received / provided feedback with other participants.    Demonstrated understanding of topics discussed through group discussion and participation, Expressed understanding of the relevance / importance of emotions management skills at distressing times in life and Self-aware of experiences with difficult emotions, and strategies to employ to manage them    Treatment Plan:  Patient has a current master individualized treatment plan.  See Epic treatment plan for more information.    Kristal Costello LMFT

## 2023-01-24 ENCOUNTER — HOSPITAL ENCOUNTER (OUTPATIENT)
Dept: BEHAVIORAL HEALTH | Facility: CLINIC | Age: 23
Discharge: HOME | End: 2023-01-24
Attending: PSYCHIATRY & NEUROLOGY
Payer: COMMERCIAL

## 2023-01-24 DIAGNOSIS — F41.1 GAD (GENERALIZED ANXIETY DISORDER): Primary | ICD-10-CM

## 2023-01-24 DIAGNOSIS — F41.1 GAD (GENERALIZED ANXIETY DISORDER): ICD-10-CM

## 2023-01-24 DIAGNOSIS — F42.2 MIXED OBSESSIONAL THOUGHTS AND ACTS: Primary | ICD-10-CM

## 2023-01-24 DIAGNOSIS — F42.2 MIXED OBSESSIONAL THOUGHTS AND ACTS: ICD-10-CM

## 2023-01-24 PROCEDURE — 90853 GROUP PSYCHOTHERAPY: CPT | Mod: GT,95

## 2023-01-24 PROCEDURE — 99214 OFFICE O/P EST MOD 30 MIN: CPT | Mod: 95

## 2023-01-24 PROCEDURE — 90853 GROUP PSYCHOTHERAPY: CPT | Mod: GT,95 | Performed by: PSYCHOLOGIST

## 2023-01-24 RX ORDER — FLUOXETINE 10 MG/1
10 CAPSULE ORAL DAILY
Start: 2023-01-24 | End: 2023-02-20

## 2023-01-24 NOTE — GROUP NOTE
Process Group Note    PATIENT'S NAME: Lady Ayers  MRN:   [id number]  :   2000  ACCT. NUMBER: [id number]  DATE OF SERVICE: 23  START TIME:  9:00 AM  END TIME:  9:50 AM  FACILITATOR: Kristal Costello LMFT  TOPIC:  Process Group    Diagnoses:  300.3 (F42) Obsessive Compulsive Disorder.  4. Other Diagnoses that is relevant to services:   300.02 (F41.1) Generalized Anxiety Disorder.         [facility] Day Treatment  TRACK: 2A    NUMBER OF PARTICIPANTS: 7                                      Service Modality:  Video Visit     Telemedicine Visit: The patient's condition can be safely assessed and treated via synchronous audio and visual telemedicine encounter.      Reason for Telemedicine Visit: Patient has requested telehealth visit    Originating Site (Patient Location): Patient's home    Distant Site (Provider Location): Provider Remote Setting- Home Office    Consent:  The patient/guardian has verbally consented to: the potential risks and benefits of telemedicine (video visit) versus in person care; bill my insurance or make self-payment for services provided; and responsibility for payment of non-covered services.     Patient would like the video invitation sent by:  My Chart    Mode of Communication:  Video Conference via Medical Zoom    As the provider I attest to compliance with applicable laws and regulations related to telemedicine.                                  Data:    Session content: At the start of this group, patients were invited to check in by identifying themselves, describing their current emotional status, and identifying issues to address in this group.   Area(s) of treatment focus addressed in this session included Symptom Management, Personal Safety, Develop / Improve Independent Living Skills and Develop Socialization / Interpersonal Relationship Skills.    Patient reported feeling not as tired as she usually is. Patient reported feeling  energized and content. Patient stated not having a lot to do today. Patient also reported feeling anxious as she started her Prozac yesterday. Patient stated also feeling the need to google about her medication and side effects which could cause placebo effects. Patient reported goal is to do some self-care and errands. Patient also reported goals are to go to the gym. Patient stated barriers are procrastination. Patient denied safety concerns. Patient stated she has not had alcohol since Saturday and is planning on not drinking for a while. Patient also reported cutting down on the Delta pen. Patient reported cutting down on vaping as well. Patient reported feeling grateful for [facility].     Therapeutic Interventions/Treatment Strategies:  Psychotherapist offered support, feedback and validation and reinforced use of skills. Treatment modalities used include Motivational Interviewing, Cognitive Behavioral Therapy and Dialectical Behavioral Therapy. Interventions include Behavioral Activation: Explored how behaviors effect mood and interact with thoughts and feelings, Symptoms Management: Promoted understanding of their diagnoses and how it impacts their functioning and Emotions Management:  Reinforced the purpose and biological basis of emotions, Discussed barriers to emotional regulation and Increased awareness of daily mood patterns/changes.    Assessment:    Patient response:   Patient responded to session by accepting feedback, listening, focusing on goals, being attentive and accepting support    Possible barriers to participation / learning include: and no barriers identified    Health Issues:   None reported       Substance Use Review:   Substance Use: No active concerns identified.    Mental Status/Behavioral Observations  Appearance:   Appropriate   Eye Contact:   Good   Psychomotor Behavior: Normal   Attitude:   Cooperative   Orientation:   All  Speech   Rate / Production: Normal    Volume:  Normal    Mood:    Anxious  Normal  Affect:    Appropriate   Thought Content:   Clear  Thought Form:  Coherent  Logical     Insight:    Good     Plan:     Safety Plan: No current safety concerns identified.  Recommended that patient call 911 or go to the local ED should there be a change in any of these risk factors.     Barriers to treatment: None identified    Patient Contracts (see media tab):  None    Substance Use: Not addressed in session     Continue or Discharge: Patient will continue in Adult Day Treatment (ADT)  as planned. Patient is likely to benefit from learning and using skills as they work toward the goals identified in their treatment plan.      Kristal Costello, MONTRELL  January 24, 2023

## 2023-01-24 NOTE — PROGRESS NOTES
"[facility]   Adult Mental Health Outpatient Programs  Provider Interval History Note    Program (track): Intensive Outpatient Program (track 2A)    PATIENT'S NAME: Lady Ayers  MRN:   [id number]  :   2000  ACCT. NUMBER: [id number]  DATE OF SERVICE: 23  CALL/VIDEO START TIME: 09:30  CALL/VIDEO END TIME: 10:00      Interval History:  \"I really like it.\" Lady presents today for follow-up and ongoing program supervision.   Endorses:    Patient got the Fluoxetine  o Started first dose   o Took 10 mg Fluoxetine  - Was able to tolerate the 10 mg     Learning skills like emotion management    Medication  o Fluoxetine Tablet changed to capsule    Symptoms:    Same as last time    Substance use:    Same as before    Reactions/thoughts about program:    I really like my group      Risk Assessment:    Suicidal ideation: denies current or recent suicidal ideation or behavior    Thoughts of non-suicidal self-injury: denied    Recent self-injurious behavior: denied    Homicidal ideation: denied    Other safety concerns: denied      Medications:  Current Outpatient Medications   Medication Sig Dispense Refill     FLUoxetine (PROZAC) 10 MG tablet Take 1 tablet (10 mg) by mouth daily Take 5 mg for 5 days then 10 mg Daily 30 tablet 0         The above list was reviewed with patient today.     Patient is taking medications as prescribed and denies adverse effects      Laboratory Results:  Most recent labs reviewed. Pertinent updates/findings: None.     Metrics:  PHQ-9 scores:   PHQ-9 SCORE 2023   PHQ-9 Total Score 6       LUKE-7 scores:   LUKE-7 SCORE 2023   Total Score - - 12 (moderate anxiety)   Total Score 12 12 12       CSSR-S: No flowsheet data found.      Mental Status Examination (limited due to video virtual visit format):  Vital Signs: There were no vitals taken for this virtual visit.  Appearance: adequately groomed, appears stated " "age, and in no apparent distress.  Attitude: cooperative   Eye Contact: good to the extent that can be determined in a video visit  Muscle Strength and Tone: no gross abnormalities based on remote observation  Psychomotor Behavior: Appropriate and Calm; no evidence of tardive dyskinesia, dystonia, or tics based on remote observation  Gait and Station: normal, no gross abnormalities based on remote observation  Speech: normal rate, production, volume, and rhythm of  Associations: No loosening of associations  Thought Process: coherent and goal directed  Thought Content: no evidence of suicidal ideation or homicidal ideation, no evidence of psychotic thought, no auditory hallucinations present and no visual hallucinations present  Mood: \"anxious and depressed\"  Affect: mood congruent  Insight: good  Judgment: intact, adequate for safety  Impulse Control: intact  Oriented to: time, place, person and situation  Attention Span and Concentration: normal  Language: Intact  Recent and Remote Memory: intact to interview. Not formally assessed. No amnesia.  Fund of Knowledge/Assessment of Intelligence: Average  Capacity of Activities of Daily Living: Independent, able to participate in programmatic care services.      Diagnosis/es:  1. LUKE (generalized anxiety disorder)    2. Mixed obsessional thoughts and acts        Assessment/Plan:  Lady presents today for follow up. Started Fluoxetine on Monday Jan 23, started with 10 mg daily. Today is second day of the dose. Patient denies any side effects. Patient will continue with current dose for 7 days, will meet next weeks to evaluation and titrate up to 20 mg. Our goal is reach a dose that treat both anxiety and OCD symptoms. Patient will continue to engage in psychotherapy. Patient verbalized understand and agreed to current treatment plan.      Anxiety  o Overall stable   o Continue with Fluoxetine 10 mg for 7 days  - Meet on Tues next week to evaluate and titrate up to 10 " mg   - Monitor response and adverse reaction  o Mainttain good sleep hygiene  o Maintain a balanced diet  o Engage in physical exercise as tolerated      OCD  o Overall stable   o As noted above    Continue all other medications as reviewed per electronic medical record today    Continue therapy as planned:    Enrolled in Intensive Outpatient Program (track 2A)    Continues to benefit from services    Continues to meet criteria for this level of care    Patient would be at reasonable risk of requiring a higher level of care in the absence of current services.    I feel this patient does not meet criteria for an involuntary hold and is appropriate for treatment at an outpatient level of care.    Continue with individual therapist as appropriate    Safety plan reviewed:    To the Emergency Department as needed or call after hours crisis line at [phone] or [phone]. [place] Crisis Text Line: Text [place] to 256326 or Suicide LifeLine Chat: suicidepreSelf-A-r-Tline.org/chat    Follow-up:     schedule an appointment with me or another program provider in approximately in 1 week(s) or sooner if needed.  Can speak with a staff member or call the appropriate program number (see below) to schedule    Follow up with outpatient provider(s) as planned or sooner if needed for acute medical concerns.    Questions or concerns:    Call program line with questions or concerns (see below)    [place] may be used to communicate with your provider, but this is not intended to be used for emergencies.      [facility] Adult Mental Health Program lines:  [facility]: [phone]  Dual Disorder: [phone]  Adult Day Treatment:  [phone]  55+/Intensive Outpatient: [phone]    Community Resources:    National Suicide Prevention Lifeline: 988 from any phone, or 840-925-3006 (TTY: 705.281.2119). Call anytime for help.  (www.suicidepreventionlifeline.org)  National [place] on Mental Illness (www.mich.org):  530-700-7440 or 625-729-1297.   Mental Health Association (www.mentalhealth.org): 519.336.8668 or 314-238-3489.  [place] Crisis Text Line: Text [place] to 371584  Suicide LifeLine Chat: suicidepreventionlifeline.org/chat    Treatment Objective(s) Addressed in This Session:  The purpose of today's virtual visit is for this writer to provide oversight of patient's care while receiving program services. Specific treatment goals addressed included personal safety, symptoms stabilization and management, wellness and mental health, and community resources/discharge planning.     This author or another program provider will follow up with the patient as noted above.     Patient agrees with the current plan of care.    JESUSITA SCHILLING CNP  1/24/23      Visit Details:  Type of service:  Video Visit    Start/End Time: see above    Originating Location (pt. Location): Home in [place]    Distant Location (provider location): Provider Remote Setting- Home Office    Platform used for Video Visit: Zoom    Physician has received verbal consent for a Video Visit from the patient? Yes    30 minutes spent on the date of the encounter doing chart review, patient visit, documentation and discussion with other provider(s)     This document completed in part using Dragon Medical One dictation software.  Please excuse any inadvertent word or phrase substitutions.

## 2023-01-24 NOTE — GROUP NOTE
Psychotherapy Group Note                                    Service Modality:  Video Visit     Telemedicine Visit: The patient's condition can be safely assessed and treated via synchronous audio and visual telemedicine encounter.      Reason for Telemedicine Visit: Patient has requested telehealth visit, Patient unable to travel, Patient convenience (e.g. access to timely appointments / distance to available provider), Patient lives in a designated Health Professional Shortage Area (HPSA), and Services only offered telehealth    Originating Site (Patient Location): Patient's home    Distant Site (Provider Location): [facility]: [facility], [facility]    Consent:  The patient/guardian has verbally consented to: the potential risks and benefits of telemedicine (video visit) versus in person care; bill my insurance or make self-payment for services provided; and responsibility for payment of non-covered services.     Patient would like the video invitation sent by:  My Chart    Mode of Communication:  Video Conference via Medical Zoom    As the provider I attest to compliance with applicable laws and regulations related to telemedicine.        PATIENT'S NAME: Lady Ayers  MRN:   [id number]  :   2000  ACCT. NUMBER: [id number]  DATE OF SERVICE: 23  START TIME: 11:00 AM  END TIME: 11:50 AM  FACILITATOR: Karmen Bazan PsyD  TOPIC:  EBP Group: Specialty Awareness  [facility] Adult Mental Health Day Treatment  TRACK: 2A    NUMBER OF PARTICIPANTS: 6    Summary of Group / Topics Discussed:  Specialty Topics: Hope: The topic of hope was presented in order to help patients better understand the symptoms of hopelessness and how to become more hopeful. Patients discussed their current awareness of the topic and relevance to their functioning. Individual experiences with symptoms and treatment options were also discussed. Patients explored options for ongoing/future treatment and  symptom management.      Patient Session Goals / Objectives:    Discussed definition of hopelessness    Discussed how hopelessness impacts functioning    Set a plan to utilize skills to reduce hopelessness      Patient Participation / Response:  Fully participated with the group by sharing personal reflections / insights and openly received / provided feedback with other participants.  Caro talked about journaling and writing affirmations to help her with depression.  Verbalized understanding of ways to proactively manage illness    Treatment Plan:  Patient has a current master individualized treatment plan.  See Epic treatment plan for more information.    Karmen Bazan PsyD

## 2023-01-24 NOTE — GROUP NOTE
Psychotherapy Group Note    PATIENT'S NAME: Lady Ayers  MRN:   [id number]  :   2000  ACCT. NUMBER: [id number]  DATE OF SERVICE: 23  START TIME: 10:00 AM  END TIME: 11:00 AM  FACILITATOR: Kristal Costello LMFT  TOPIC:  EBP Group: Emotions Management  [facility] Mental Health Day Treatment  TRACK: 2A    NUMBER OF PARTICIPANTS: 7                                      Service Modality:  Video Visit     Telemedicine Visit: The patient's condition can be safely assessed and treated via synchronous audio and visual telemedicine encounter.      Reason for Telemedicine Visit: Patient has requested telehealth visit    Originating Site (Patient Location): Patient's home    Distant Site (Provider Location): Provider Remote Setting- Home Office    Consent:  The patient/guardian has verbally consented to: the potential risks and benefits of telemedicine (video visit) versus in person care; bill my insurance or make self-payment for services provided; and responsibility for payment of non-covered services.     Patient would like the video invitation sent by:  My Chart    Mode of Communication:  Video Conference via Medical Zoom    As the provider I attest to compliance with applicable laws and regulations related to telemedicine.          Summary of Group / Topics Discussed:  Emotions Management: Anger: Patients explored and shared personal experiences associated with feelings of anger.  Group explored how these feelings develop, what they mean to each individual, and how to increase acceptance and usefulness of these feelings.  Discussed anger as a  secondary  emotion and reviewed ways to manage anger and challenge associated cognitive distortions. Group members worked to contextualize these concepts and promote healing.     Patient Session Goals / Objectives:    Discuss and review definitions and personal views/experiences with anger    Explore how feelings of anger impact  functioning    Understand and practice strategies to manage difficult emotions and move towards healing    Demonstrate understanding of the feelings of anger    Verbalize how these emotions have impacted their lives/functioning    Verbalize of knowledge gained and possible interventions to manage feelings      Patient Participation / Response:  Moderately participated, sharing some personal reflections / insights and adequately adequately received / provided feedback with other participants.    Demonstrated understanding of topics discussed through group discussion and participation, Expressed understanding of the relevance / importance of emotions management skills at distressing times in life and Self-aware of experiences with difficult emotions, and strategies to employ to manage them    Treatment Plan:  Patient has a current master individualized treatment plan.  See Epic treatment plan for more information.    Kristal Costello, ARINAFT

## 2023-01-26 ENCOUNTER — HOSPITAL ENCOUNTER (OUTPATIENT)
Dept: BEHAVIORAL HEALTH | Facility: CLINIC | Age: 23
Discharge: HOME | End: 2023-01-26
Attending: PSYCHIATRY & NEUROLOGY
Payer: COMMERCIAL

## 2023-01-26 DIAGNOSIS — F41.1 GAD (GENERALIZED ANXIETY DISORDER): ICD-10-CM

## 2023-01-26 DIAGNOSIS — F42.2 MIXED OBSESSIONAL THOUGHTS AND ACTS: Primary | ICD-10-CM

## 2023-01-26 PROCEDURE — 90853 GROUP PSYCHOTHERAPY: CPT | Mod: GT,95

## 2023-01-26 NOTE — DISCHARGE SUMMARY
"       Adult Mental Health Intensive Outpatient Discharge Summary/Instructions      Patient: Lady Ayers MRN: [id number]   : 2000 Age: 22 year old Sex: female     Admission Date: 23  Discharge Date: 23  Diagnosis: 300.3 (F42) Obsessive Compulsive Disorder.  4. Other Diagnoses that is relevant to services:   300.02 (F41.1) Generalized Anxiety Disorder.       Focus of Treatment / Progress    Personal Safety: Patient denied safety concerns throughout treatment.      * Follow your safety plan     * Call crisis lines as needed:    [facility] [phone] [facility] [phone]  [facility] [phone] Crisis Connection [phone]  [facility] [phone] [facility] [phone]  [facility] [phone] National Suicide Prevention [phone]  [facility] [phone] Suicide Prevention [phone]  [facility] [phone]    Managing symptoms of:  anxiety and OCD    Community support/health:  National [place] on Mental Illness   Https://namimn.org/    [place] Warm line Peer support  https://mentalhealthmn.org/support/[place]-warmline/   398-996-5899  Text \"Support\" to 84682    [facility] Help line  [phone]     Managing Symptoms and Preventing Relapse    * Go to all of your appointments    * Take all medications as directed.      * Carry a current list if medication with you    * Do not use illicit (street) drugs.  Avoid alcohol    * Report these symptoms to your care team. These are early signs of relapse:   Thoughts of suicide   Losing more sleep   Increased confusion   Mood getting worse   Feeling more aggressive   Other:  increase in vaping, intrusive thoughts    *Use these skills daily:  Create a routine or structure that works for your needs, practice mindfulness, move your body intentionally every day, eat regular balanced meals daily, reach out to supports, and practice self-compassion.      Copy of summary sent to: " Patient    Follow up with psychiatrist / main caregiver: Shun Wood    Next visit: As scheduled    Follow up with your therapist: Utilize psychology today website to locate therapists with specialties in OCD       Go to group therapy and / or support groups at: [facility] and Depression Bipolar Support [place](DBSA) support groups     See your medical doctor about:  Medication refills    Other:  Your treatment team appreciates having the opportunity to work with you and wishes you the best.    Client Signature:__Unable to sign due to COVID-19.  Staff Signature:____MONTRELL Light on 1/26/2023 at 2:29 PM

## 2023-01-26 NOTE — GROUP NOTE
Process Group Note    PATIENT'S NAME: Lady Ayers  MRN:   [id number]  :   2000  ACCT. NUMBER: [id number]  DATE OF SERVICE: 23  START TIME:  9:00 AM  END TIME:  9:50 AM  FACILITATOR: Kristal Costello LMFT  TOPIC:  Process Group    Diagnoses:  300.3 (F42) Obsessive Compulsive Disorder.  4. Other Diagnoses that is relevant to services:   300.02 (F41.1) Generalized Anxiety Disorder.         [facility] Mental Health Day Treatment  TRACK: 2A    NUMBER OF PARTICIPANTS: 6                                      Service Modality:  Video Visit     Telemedicine Visit: The patient's condition can be safely assessed and treated via synchronous audio and visual telemedicine encounter.      Reason for Telemedicine Visit: Patient has requested telehealth visit    Originating Site (Patient Location): Patient's home    Distant Site (Provider Location): Provider Remote Setting- Home Office    Consent:  The patient/guardian has verbally consented to: the potential risks and benefits of telemedicine (video visit) versus in person care; bill my insurance or make self-payment for services provided; and responsibility for payment of non-covered services.     Patient would like the video invitation sent by:  My Chart    Mode of Communication:  Video Conference via Medical Zoom    As the provider I attest to compliance with applicable laws and regulations related to telemedicine.                                  Data:    Session content: At the start of this group, patients were invited to check in by identifying themselves, describing their current emotional status, and identifying issues to address in this group.   Area(s) of treatment focus addressed in this session included Symptom Management, Personal Safety, Develop / Improve Independent Living Skills and Develop Socialization / Interpersonal Relationship Skills.    Patient reported feeling anxious today. Patient stated anxiety stems from her new  medication and being unable to sleep. Patient received feedback from group about timing of taking medications. Patient reported she is planning on starting work Monday which will impact group schedule. Patient stated her goal for today is to try and make herself a meal. Patient stated barriers are being lazy and not doing it. Patient stated skills are to just do it. Patient denied safety concerns or chemical use. Patient reported feeling grateful for coffee today.     Therapeutic Interventions/Treatment Strategies:  Psychotherapist offered support, feedback and validation and reinforced use of skills. Treatment modalities used include Motivational Interviewing, Cognitive Behavioral Therapy and Dialectical Behavioral Therapy. Interventions include Behavioral Activation: Explored how behaviors effect mood and interact with thoughts and feelings, Emotions Management:  Reinforced the purpose and biological basis of emotions, Discussed barriers to emotional regulation, Reviewed opposite action skill and Increased awareness of daily mood patterns/changes and Relationship Skills: Discussed strategies to promote healthier understanding of interpersonal relationships.    Assessment:    Patient response:   Patient responded to session by accepting feedback, giving feedback, listening, focusing on goals, being attentive and accepting support    Possible barriers to participation / learning include: and no barriers identified    Health Issues:   None reported       Substance Use Review:   Substance Use: No active concerns identified.    Mental Status/Behavioral Observations  Appearance:   Appropriate   Eye Contact:   Good   Psychomotor Behavior: Normal   Attitude:   Cooperative   Orientation:   All  Speech   Rate / Production: Normal    Volume:  Normal   Mood:    Anxious   Affect:    Appropriate   Thought Content:   Clear  Thought Form:  Coherent  Logical     Insight:    Good     Plan:     Safety Plan: No current safety  concerns identified.  Recommended that patient call 911 or go to the local ED should there be a change in any of these risk factors.     Barriers to treatment: None identified    Patient Contracts (see media tab):  None    Substance Use: Not addressed in session     Continue or Discharge: Patient will continue in Adult Day Treatment (ADT)  as planned. Patient is likely to benefit from learning and using skills as they work toward the goals identified in their treatment plan.      MONTRELL Light  January 26, 2023

## 2023-01-26 NOTE — GROUP NOTE
Psychotherapy Group Note    PATIENT'S NAME: Lady Ayers  MRN:   [id number]  :   2000  ACCT. NUMBER: [id number]  DATE OF SERVICE: 23  START TIME: 10:00 AM  END TIME: 10:50 AM  FACILITATOR: Kristal Costello LMFT  TOPIC:  EBP Group: Emotions Management  [facility] Mental Health Day Treatment  TRACK: 2A    NUMBER OF PARTICIPANTS: 6                                      Service Modality:  Video Visit     Telemedicine Visit: The patient's condition can be safely assessed and treated via synchronous audio and visual telemedicine encounter.      Reason for Telemedicine Visit: Patient has requested telehealth visit    Originating Site (Patient Location): Patient's home    Distant Site (Provider Location): Provider Remote Setting- Home Office    Consent:  The patient/guardian has verbally consented to: the potential risks and benefits of telemedicine (video visit) versus in person care; bill my insurance or make self-payment for services provided; and responsibility for payment of non-covered services.     Patient would like the video invitation sent by:  My Chart    Mode of Communication:  Video Conference via Medical Zoom    As the provider I attest to compliance with applicable laws and regulations related to telemedicine.           Summary of Group / Topics Discussed:  Emotions Management: Opposite to Emotion: Patients discussed past and present struggles with knowing how to make changes in their lives due to difficult emotional experiences.  Explored desires to experience and feel less anger, sadness, guilt, and fear.  Reviewed the therapeutic skill of opposite action and patients explored opportunities to use their behaviors as a tool to reduce an emotion that they want to change.     Patient Session Goals / Objectives:    Review DBT concepts and focus on patient s experiences of distress and difficult emotional experiences.    Learn how to do the opposite of what an emotion makes us  want to do in an effort to decrease an unwanted emotional experience.    Demonstrate understanding of the skill of opposite action by sharing experiences where the technique could be useful in past / present situations.      Patient Participation / Response:  Fully participated with the group by sharing personal reflections / insights and openly received / provided feedback with other participants.    Demonstrated understanding of topics discussed through group discussion and participation, Expressed understanding of the relevance / importance of emotions management skills at distressing times in life, Self-aware of experiences with difficult emotions, and strategies to employ to manage them and Identified emotions management strategies that have helped maintain / improve symptoms in the past    Treatment Plan:  Patient has See Epic Treatment Plan - Patient is discharging.    Kristal Costello LMFT

## 2023-01-26 NOTE — GROUP NOTE
Psychotherapy Group Note    PATIENT'S NAME: Lady Ayers  MRN:   [id number]  :   2000  ACCT. NUMBER: [id number]  DATE OF SERVICE: 23  START TIME: 11:00 AM  END TIME: 11:50 AM  FACILITATOR: Ligia Bliss LICSW  TOPIC: MH EBP Group: Self-Awareness  [facility] Day Treatment  TRACK: 2A                                    Service Modality:  Video Visit     Telemedicine Visit: The patient's condition can be safely assessed and treated via synchronous audio and visual telemedicine encounter.      Reason for Telemedicine Visit: Services only offered telehealth    Originating Site (Patient Location): Patient's home    Distant Site (Provider Location): [facility] Outpatient Setting: [facility]     Consent:  The patient/guardian has verbally consented to: the potential risks and benefits of telemedicine (video visit) versus in person care; bill my insurance or make self-payment for services provided; and responsibility for payment of non-covered services.     Patient would like the video invitation sent by:  My Chart    Mode of Communication:  Video Conference via Medical Zoom    As the provider I attest to compliance with applicable laws and regulations related to telemedicine.         NUMBER OF PARTICIPANTS: 4    Summary of Group / Topics Discussed:  Self-Awareness: Gratitude/Acknowledging Accomplishments: Topic focused on assisting patients in identifying key concepts in gratitude. Patients discussed the benefits of practicing gratitude and its impact on mood improvement, mindfulness, and perspective. Patients worked to increase time spent on recognition and appreciation of what is positive and working in their lives. Patients discussed the concepts and benefits of feeling grateful. Patients discussed the benefits of taking credit for accomplishments. The goal is to reduce rumination and negative thinking resulting in increased mindfulness and resilience. Patients  specifically discussed how they can practice and problem solve barriers to daily gratitude practice.     Patient Session Goals / Objectives:    [place] the concept and benefits of gratitude    Identified accomplishments    Identified ways to practice gratitude in daily life    Problem solved barriers to practicing gratitude      Patient Participation / Response:  Fully participated with the group by sharing personal reflections / insights and openly received / provided feedback with other participants.    Demonstrated understanding of topics discussed through group discussion and participation, Identified plan to address barriers to practicing skills that promote self-awareness  and Practiced skills in session    Treatment Plan:  Patient has See Epic Treatment Plan - Patient is discharging.    Ligia Ross, AMBARSW

## 2023-02-05 ENCOUNTER — HEALTH MAINTENANCE LETTER (OUTPATIENT)
Age: 23
End: 2023-02-05

## 2023-02-15 ENCOUNTER — TELEPHONE (OUTPATIENT)
Dept: BEHAVIORAL HEALTH | Facility: CLINIC | Age: 23
End: 2023-02-15
Payer: COMMERCIAL

## 2023-02-15 NOTE — TELEPHONE ENCOUNTER
No PA is needed for prozac tablet as this med was discontinued and replaced with capsules. LVM for pt directing this is not needed and to relay message to their father.

## 2023-02-15 NOTE — TELEPHONE ENCOUNTER
[2:59 PM] Jaime Hughes Fax.. request?      Former 2A, Lady PELLETIER saw Brendaogo on 1/24/23. It looks like PA was needed for the medication. Father of pt just faxed over copy of letter of denial of the PA/med request.   Pt d/c from program on 1/26/23.   Do we proceed with this PA?

## 2023-02-20 DIAGNOSIS — F42.2 MIXED OBSESSIONAL THOUGHTS AND ACTS: ICD-10-CM

## 2023-02-20 DIAGNOSIS — F41.1 GAD (GENERALIZED ANXIETY DISORDER): ICD-10-CM

## 2023-02-20 RX ORDER — FLUOXETINE 10 MG/1
10 CAPSULE ORAL DAILY
Qty: 30 CAPSULE | Refills: 1 | Status: SHIPPED | OUTPATIENT
Start: 2023-02-20

## 2023-02-20 NOTE — TELEPHONE ENCOUNTER
Incoming call from Paul. Pt has 3 days of prozac left and is asking for a refill. Pt is in process of establishing with a new provider. I pended 1 month plus 1 refill for a total of 60 days of medication.    Recommend: approval

## 2023-12-08 ENCOUNTER — LAB REQUISITION (OUTPATIENT)
Dept: LAB | Facility: CLINIC | Age: 23
End: 2023-12-08

## 2023-12-08 DIAGNOSIS — Z20.2 CONTACT WITH AND (SUSPECTED) EXPOSURE TO INFECTIONS WITH A PREDOMINANTLY SEXUAL MODE OF TRANSMISSION: ICD-10-CM

## 2023-12-08 PROCEDURE — 86803 HEPATITIS C AB TEST: CPT | Performed by: OBSTETRICS & GYNECOLOGY

## 2023-12-08 PROCEDURE — 86780 TREPONEMA PALLIDUM: CPT | Performed by: OBSTETRICS & GYNECOLOGY

## 2023-12-08 PROCEDURE — 87491 CHLMYD TRACH DNA AMP PROBE: CPT | Performed by: OBSTETRICS & GYNECOLOGY

## 2023-12-08 PROCEDURE — 87340 HEPATITIS B SURFACE AG IA: CPT | Performed by: OBSTETRICS & GYNECOLOGY

## 2023-12-08 PROCEDURE — 87389 HIV-1 AG W/HIV-1&-2 AB AG IA: CPT | Performed by: OBSTETRICS & GYNECOLOGY

## 2024-03-03 ENCOUNTER — HEALTH MAINTENANCE LETTER (OUTPATIENT)
Age: 24
End: 2024-03-03

## 2025-03-15 ENCOUNTER — HEALTH MAINTENANCE LETTER (OUTPATIENT)
Age: 25
End: 2025-03-15

## 2025-03-19 ENCOUNTER — LAB REQUISITION (OUTPATIENT)
Dept: LAB | Facility: CLINIC | Age: 25
End: 2025-03-19

## 2025-03-19 DIAGNOSIS — Z11.3 ENCOUNTER FOR SCREENING FOR INFECTIONS WITH A PREDOMINANTLY SEXUAL MODE OF TRANSMISSION: ICD-10-CM

## 2025-03-19 DIAGNOSIS — Z12.4 ENCOUNTER FOR SCREENING FOR MALIGNANT NEOPLASM OF CERVIX: ICD-10-CM

## 2025-03-19 PROCEDURE — 87491 CHLMYD TRACH DNA AMP PROBE: CPT | Performed by: OBSTETRICS & GYNECOLOGY

## 2025-03-19 PROCEDURE — G0145 SCR C/V CYTO,THINLAYER,RESCR: HCPCS | Performed by: OBSTETRICS & GYNECOLOGY

## 2025-03-20 LAB
C TRACH DNA SPEC QL PROBE+SIG AMP: NEGATIVE
N GONORRHOEA DNA SPEC QL NAA+PROBE: NEGATIVE
SPECIMEN TYPE: NORMAL

## 2025-03-24 LAB
BKR LAB AP GYN ADEQUACY: NORMAL
BKR LAB AP GYN INTERPRETATION: NORMAL
BKR LAB AP HPV REFLEX: NORMAL
BKR LAB AP LMP: NORMAL
BKR LAB AP PREVIOUS ABNL DX: NORMAL
BKR LAB AP PREVIOUS ABNORMAL: NORMAL
PATH REPORT.COMMENTS IMP SPEC: NORMAL
PATH REPORT.COMMENTS IMP SPEC: NORMAL
PATH REPORT.RELEVANT HX SPEC: NORMAL

## 2025-06-25 ENCOUNTER — LAB REQUISITION (OUTPATIENT)
Dept: LAB | Facility: CLINIC | Age: 25
End: 2025-06-25

## 2025-06-25 DIAGNOSIS — Z11.3 ENCOUNTER FOR SCREENING FOR INFECTIONS WITH A PREDOMINANTLY SEXUAL MODE OF TRANSMISSION: ICD-10-CM

## 2025-06-25 DIAGNOSIS — R35.0 FREQUENCY OF MICTURITION: ICD-10-CM

## 2025-06-25 PROCEDURE — 87086 URINE CULTURE/COLONY COUNT: CPT | Performed by: OBSTETRICS & GYNECOLOGY

## 2025-06-25 PROCEDURE — 87340 HEPATITIS B SURFACE AG IA: CPT | Performed by: OBSTETRICS & GYNECOLOGY

## 2025-06-25 PROCEDURE — 87491 CHLMYD TRACH DNA AMP PROBE: CPT | Performed by: OBSTETRICS & GYNECOLOGY

## 2025-06-25 PROCEDURE — 86803 HEPATITIS C AB TEST: CPT | Performed by: OBSTETRICS & GYNECOLOGY

## 2025-06-25 PROCEDURE — 87389 HIV-1 AG W/HIV-1&-2 AB AG IA: CPT | Performed by: OBSTETRICS & GYNECOLOGY

## 2025-06-25 PROCEDURE — 86780 TREPONEMA PALLIDUM: CPT | Performed by: OBSTETRICS & GYNECOLOGY

## 2025-06-26 LAB
BACTERIA UR CULT: NORMAL
C TRACH DNA SPEC QL PROBE+SIG AMP: NEGATIVE
HBV SURFACE AG SERPL QL IA: NONREACTIVE
HCV AB SERPL QL IA: NONREACTIVE
HIV 1+2 AB+HIV1 P24 AG SERPL QL IA: NONREACTIVE
N GONORRHOEA DNA SPEC QL NAA+PROBE: NEGATIVE
SPECIMEN TYPE: NORMAL
T PALLIDUM AB SER QL: NONREACTIVE